# Patient Record
Sex: FEMALE | Race: WHITE | NOT HISPANIC OR LATINO
[De-identification: names, ages, dates, MRNs, and addresses within clinical notes are randomized per-mention and may not be internally consistent; named-entity substitution may affect disease eponyms.]

---

## 2017-02-06 ENCOUNTER — APPOINTMENT (OUTPATIENT)
Dept: MAMMOGRAPHY | Facility: IMAGING CENTER | Age: 47
End: 2017-02-06

## 2017-02-06 ENCOUNTER — APPOINTMENT (OUTPATIENT)
Dept: ULTRASOUND IMAGING | Facility: IMAGING CENTER | Age: 47
End: 2017-02-06

## 2017-02-06 ENCOUNTER — OUTPATIENT (OUTPATIENT)
Dept: OUTPATIENT SERVICES | Facility: HOSPITAL | Age: 47
LOS: 1 days | End: 2017-02-06
Payer: COMMERCIAL

## 2017-02-06 DIAGNOSIS — Z00.00 ENCOUNTER FOR GENERAL ADULT MEDICAL EXAMINATION WITHOUT ABNORMAL FINDINGS: ICD-10-CM

## 2017-02-06 PROCEDURE — G0279: CPT

## 2017-02-06 PROCEDURE — 77065 DX MAMMO INCL CAD UNI: CPT

## 2017-02-06 PROCEDURE — 76642 ULTRASOUND BREAST LIMITED: CPT

## 2017-02-19 ENCOUNTER — EMERGENCY (EMERGENCY)
Facility: HOSPITAL | Age: 47
LOS: 1 days | Discharge: HOME | End: 2017-02-19
Attending: EMERGENCY MEDICINE | Admitting: EMERGENCY MEDICINE
Payer: COMMERCIAL

## 2017-02-19 VITALS
DIASTOLIC BLOOD PRESSURE: 83 MMHG | HEART RATE: 81 BPM | TEMPERATURE: 98 F | OXYGEN SATURATION: 98 % | SYSTOLIC BLOOD PRESSURE: 132 MMHG | RESPIRATION RATE: 18 BRPM

## 2017-02-19 VITALS
RESPIRATION RATE: 20 BRPM | OXYGEN SATURATION: 97 % | SYSTOLIC BLOOD PRESSURE: 145 MMHG | HEART RATE: 80 BPM | DIASTOLIC BLOOD PRESSURE: 92 MMHG | HEIGHT: 69 IN | WEIGHT: 205.03 LBS | TEMPERATURE: 97 F

## 2017-02-19 DIAGNOSIS — Z88.1 ALLERGY STATUS TO OTHER ANTIBIOTIC AGENTS STATUS: ICD-10-CM

## 2017-02-19 DIAGNOSIS — M54.5 LOW BACK PAIN: ICD-10-CM
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
## 2017-02-19 DIAGNOSIS — K59.00 CONSTIPATION, UNSPECIFIED: ICD-10-CM
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
## 2017-02-19 LAB
ALBUMIN SERPL ELPH-MCNC: 4.7 G/DL — SIGNIFICANT CHANGE UP (ref 3.3–5)
ALP SERPL-CCNC: 68 U/L — SIGNIFICANT CHANGE UP (ref 40–120)
ALT FLD-CCNC: 26 U/L RC — SIGNIFICANT CHANGE UP (ref 10–45)
ANION GAP SERPL CALC-SCNC: 17 MMOL/L — SIGNIFICANT CHANGE UP (ref 5–17)
APPEARANCE UR: CLEAR — SIGNIFICANT CHANGE UP
AST SERPL-CCNC: 20 U/L — SIGNIFICANT CHANGE UP (ref 10–40)
BACTERIA # UR AUTO: ABNORMAL /HPF
BASOPHILS # BLD AUTO: 0 K/UL — SIGNIFICANT CHANGE UP (ref 0–0.2)
BASOPHILS NFR BLD AUTO: 0.3 % — SIGNIFICANT CHANGE UP (ref 0–2)
BILIRUB SERPL-MCNC: 0.3 MG/DL — SIGNIFICANT CHANGE UP (ref 0.2–1.2)
BILIRUB UR-MCNC: NEGATIVE — SIGNIFICANT CHANGE UP
BUN SERPL-MCNC: 11 MG/DL — SIGNIFICANT CHANGE UP (ref 7–23)
CALCIUM SERPL-MCNC: 9.2 MG/DL — SIGNIFICANT CHANGE UP (ref 8.4–10.5)
CHLORIDE SERPL-SCNC: 102 MMOL/L — SIGNIFICANT CHANGE UP (ref 96–108)
CO2 SERPL-SCNC: 20 MMOL/L — LOW (ref 22–31)
COLOR SPEC: COLORLESS — SIGNIFICANT CHANGE UP
CREAT SERPL-MCNC: 0.73 MG/DL — SIGNIFICANT CHANGE UP (ref 0.5–1.3)
DIFF PNL FLD: NEGATIVE — SIGNIFICANT CHANGE UP
EOSINOPHIL # BLD AUTO: 0 K/UL — SIGNIFICANT CHANGE UP (ref 0–0.5)
EOSINOPHIL NFR BLD AUTO: 0.1 % — SIGNIFICANT CHANGE UP (ref 0–6)
EPI CELLS # UR: SIGNIFICANT CHANGE UP /HPF
ERYTHROCYTE [SEDIMENTATION RATE] IN BLOOD: 5 MM/HR — SIGNIFICANT CHANGE UP (ref 0–15)
GLUCOSE SERPL-MCNC: 131 MG/DL — HIGH (ref 70–99)
GLUCOSE UR QL: NEGATIVE — SIGNIFICANT CHANGE UP
HCG UR QL: NEGATIVE — SIGNIFICANT CHANGE UP
HCT VFR BLD CALC: 42 % — SIGNIFICANT CHANGE UP (ref 34.5–45)
HGB BLD-MCNC: 14.1 G/DL — SIGNIFICANT CHANGE UP (ref 11.5–15.5)
KETONES UR-MCNC: NEGATIVE — SIGNIFICANT CHANGE UP
LEUKOCYTE ESTERASE UR-ACNC: NEGATIVE — SIGNIFICANT CHANGE UP
LYMPHOCYTES # BLD AUTO: 1 K/UL — SIGNIFICANT CHANGE UP (ref 1–3.3)
LYMPHOCYTES # BLD AUTO: 8.9 % — LOW (ref 13–44)
MCHC RBC-ENTMCNC: 32.4 PG — SIGNIFICANT CHANGE UP (ref 27–34)
MCHC RBC-ENTMCNC: 33.5 GM/DL — SIGNIFICANT CHANGE UP (ref 32–36)
MCV RBC AUTO: 96.6 FL — SIGNIFICANT CHANGE UP (ref 80–100)
MONOCYTES # BLD AUTO: 0.1 K/UL — SIGNIFICANT CHANGE UP (ref 0–0.9)
MONOCYTES NFR BLD AUTO: 1.1 % — LOW (ref 2–14)
NEUTROPHILS # BLD AUTO: 9.9 K/UL — HIGH (ref 1.8–7.4)
NEUTROPHILS NFR BLD AUTO: 89.7 % — HIGH (ref 43–77)
NITRITE UR-MCNC: NEGATIVE — SIGNIFICANT CHANGE UP
PH UR: 6.5 — SIGNIFICANT CHANGE UP (ref 4.8–8)
PLATELET # BLD AUTO: 375 K/UL — SIGNIFICANT CHANGE UP (ref 150–400)
POTASSIUM SERPL-MCNC: 4.3 MMOL/L — SIGNIFICANT CHANGE UP (ref 3.5–5.3)
POTASSIUM SERPL-SCNC: 4.3 MMOL/L — SIGNIFICANT CHANGE UP (ref 3.5–5.3)
PROT SERPL-MCNC: 7.5 G/DL — SIGNIFICANT CHANGE UP (ref 6–8.3)
PROT UR-MCNC: NEGATIVE — SIGNIFICANT CHANGE UP
RBC # BLD: 4.36 M/UL — SIGNIFICANT CHANGE UP (ref 3.8–5.2)
RBC # FLD: 11.9 % — SIGNIFICANT CHANGE UP (ref 10.3–14.5)
RBC CASTS # UR COMP ASSIST: SIGNIFICANT CHANGE UP /HPF (ref 0–2)
SODIUM SERPL-SCNC: 139 MMOL/L — SIGNIFICANT CHANGE UP (ref 135–145)
SP GR SPEC: 1 — LOW (ref 1.01–1.02)
UROBILINOGEN FLD QL: NEGATIVE — SIGNIFICANT CHANGE UP
WBC # BLD: 11.1 K/UL — HIGH (ref 3.8–10.5)
WBC # FLD AUTO: 11.1 K/UL — HIGH (ref 3.8–10.5)
WBC UR QL: SIGNIFICANT CHANGE UP /HPF (ref 0–5)

## 2017-02-19 PROCEDURE — 80053 COMPREHEN METABOLIC PANEL: CPT

## 2017-02-19 PROCEDURE — 72131 CT LUMBAR SPINE W/O DYE: CPT | Mod: 26

## 2017-02-19 PROCEDURE — 96374 THER/PROPH/DIAG INJ IV PUSH: CPT

## 2017-02-19 PROCEDURE — 72131 CT LUMBAR SPINE W/O DYE: CPT

## 2017-02-19 PROCEDURE — 81001 URINALYSIS AUTO W/SCOPE: CPT

## 2017-02-19 PROCEDURE — 99284 EMERGENCY DEPT VISIT MOD MDM: CPT | Mod: 25

## 2017-02-19 PROCEDURE — 99285 EMERGENCY DEPT VISIT HI MDM: CPT | Mod: 25

## 2017-02-19 PROCEDURE — 96375 TX/PRO/DX INJ NEW DRUG ADDON: CPT

## 2017-02-19 PROCEDURE — 85027 COMPLETE CBC AUTOMATED: CPT

## 2017-02-19 PROCEDURE — 81025 URINE PREGNANCY TEST: CPT

## 2017-02-19 PROCEDURE — 85652 RBC SED RATE AUTOMATED: CPT

## 2017-02-19 RX ORDER — ACETAMINOPHEN 500 MG
1000 TABLET ORAL ONCE
Qty: 0 | Refills: 0 | Status: COMPLETED | OUTPATIENT
Start: 2017-02-19 | End: 2017-02-19

## 2017-02-19 RX ORDER — DIAZEPAM 5 MG
5 TABLET ORAL ONCE
Qty: 0 | Refills: 0 | Status: DISCONTINUED | OUTPATIENT
Start: 2017-02-19 | End: 2017-02-19

## 2017-02-19 RX ORDER — KETOROLAC TROMETHAMINE 30 MG/ML
15 SYRINGE (ML) INJECTION ONCE
Qty: 0 | Refills: 0 | Status: DISCONTINUED | OUTPATIENT
Start: 2017-02-19 | End: 2017-02-19

## 2017-02-19 RX ORDER — MORPHINE SULFATE 50 MG/1
4 CAPSULE, EXTENDED RELEASE ORAL ONCE
Qty: 0 | Refills: 0 | Status: DISCONTINUED | OUTPATIENT
Start: 2017-02-19 | End: 2017-02-19

## 2017-02-19 RX ORDER — DEXAMETHASONE 0.5 MG/5ML
10 ELIXIR ORAL ONCE
Qty: 0 | Refills: 0 | Status: COMPLETED | OUTPATIENT
Start: 2017-02-19 | End: 2017-02-19

## 2017-02-19 RX ORDER — MORPHINE SULFATE 50 MG/1
1 CAPSULE, EXTENDED RELEASE ORAL
Qty: 4 | Refills: 0 | OUTPATIENT
Start: 2017-02-19 | End: 2017-02-21

## 2017-02-19 RX ORDER — DIAZEPAM 5 MG
1 TABLET ORAL
Qty: 12 | Refills: 0 | OUTPATIENT
Start: 2017-02-19 | End: 2017-02-22

## 2017-02-19 RX ORDER — HYDROMORPHONE HYDROCHLORIDE 2 MG/ML
1 INJECTION INTRAMUSCULAR; INTRAVENOUS; SUBCUTANEOUS
Qty: 8 | Refills: 0 | OUTPATIENT
Start: 2017-02-19 | End: 2017-02-21

## 2017-02-19 RX ORDER — ONDANSETRON 8 MG/1
4 TABLET, FILM COATED ORAL ONCE
Qty: 0 | Refills: 0 | Status: COMPLETED | OUTPATIENT
Start: 2017-02-19 | End: 2017-02-19

## 2017-02-19 RX ORDER — SODIUM CHLORIDE 9 MG/ML
1000 INJECTION INTRAMUSCULAR; INTRAVENOUS; SUBCUTANEOUS ONCE
Qty: 0 | Refills: 0 | Status: COMPLETED | OUTPATIENT
Start: 2017-02-19 | End: 2017-02-19

## 2017-02-19 RX ADMIN — Medication 15 MILLIGRAM(S): at 07:12

## 2017-02-19 RX ADMIN — ONDANSETRON 4 MILLIGRAM(S): 8 TABLET, FILM COATED ORAL at 10:15

## 2017-02-19 RX ADMIN — Medication 5 MILLIGRAM(S): at 08:48

## 2017-02-19 RX ADMIN — Medication 5 MILLIGRAM(S): at 09:21

## 2017-02-19 RX ADMIN — Medication 102 MILLIGRAM(S): at 07:13

## 2017-02-19 RX ADMIN — SODIUM CHLORIDE 1000 MILLILITER(S): 9 INJECTION INTRAMUSCULAR; INTRAVENOUS; SUBCUTANEOUS at 10:15

## 2017-02-19 RX ADMIN — Medication 15 MILLIGRAM(S): at 08:00

## 2017-02-19 RX ADMIN — MORPHINE SULFATE 4 MILLIGRAM(S): 50 CAPSULE, EXTENDED RELEASE ORAL at 10:15

## 2017-02-19 RX ADMIN — Medication 400 MILLIGRAM(S): at 07:35

## 2017-02-19 RX ADMIN — Medication 1000 MILLIGRAM(S): at 08:05
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
## 2017-02-19 NOTE — ED PROVIDER NOTE - MEDICAL DECISION MAKING DETAILS
Nichole Chna M.D: 47yoF, healthy with low back pain, acute onset after straining. no red flag symptoms (weakness, numbness/tingling, midline tenderness, incontinence); pain likely MSK in nature. will give toradol, tylenol, steroids, reassess. cannot give any relaxants as pt drove self.

## 2017-02-19 NOTE — ED PROVIDER NOTE - ATTENDING CONTRIBUTION TO CARE
Patient presenting c/o severe lower back pain began after position change two days ago.  Trying percocet/robaxin at home reporting no improvement.  No loss of B/B continence, no N/T/W.  Pain improves with movement/positioning.    On exam patient well appearing, VS WNL, RRR, CTABL, no midline TTP, neurologically intact.    Non traumatic back pain with no evidence of cauda equina/acute spinal cord emergency, will treat symptomatically with toradol/ofirmev/decadron and re-evaluate.

## 2017-02-19 NOTE — ED ADULT NURSE REASSESSMENT NOTE - NS ED NURSE REASSESS COMMENT FT1
Pt received from MATTHEW GONZALEZ in [place], alert and oriented times three, breathing spontaneous, unlabored without distress on room air, NAD, report mid thoracic back pain, will medicated with pain meds as ordered

## 2017-02-19 NOTE — ED PROVIDER NOTE - PHYSICAL EXAMINATION
Nichole Chan M.D.: patient awake alert NAD . LUNGS CTAB no wheeze no crackle. CARD RRR no m/r/g.  Abdomen soft NT ND no rebound no guarding no CVA tenderness. EXT WWP no edema no calf tenderness CV 2+DP/PT bilaterally. neuro A&Ox3 gait normal.  skin warm and dry no rash. no midline spine tenderness. negative straight leg b/l. paraspinal muscles tense and spasmodic in b/l lumbar area, but not ttp.

## 2017-02-19 NOTE — ED ADULT NURSE NOTE - OBJECTIVE STATEMENT
48 y/o female arrived to ED c.o mid back pain starting friday night. pt states that she lifted heavy bags out of the car and coughed at the same time pulling a muscle. Pt has taken old robacix, scilexa, aleve and ibuprophen. Lastly took a 5/325 mg percocet at 0430 with no relief. Pain is relieved when standing, worse with movement and deep breaths. Pt a&ox3, neg sob, neg chest pain, abd soft nontender, pulse motor sensoryx4, skin warm dry intact.

## 2017-02-19 NOTE — ED PROVIDER NOTE - PROGRESS NOTE DETAILS
pain worsening. after discussion with patient decision made to give valium. pt understands that she will not be able to drive home for a few hours. pt okay with this, states she could not go home with the pain as it is. 48yo Neurosurgery  NP, hx of low back pain prior which is usually relieved with NSAIDs, on friday bent over to  groceries and since then have significant back pain. took regular motrin robaxin but still complaining of pain. no fevers/chills. nonfocal neuro exam. extremely anxious and requesting imaging studies. will obtain UA, CT L-spine, and reevaluate. -ZR labs wnl. ct negative. pt feels reassured that nothing serious ongoing. okay to go home. pharmacy called regarding prescription. changed from morphine ER to dilaudid IR.

## 2017-02-20 NOTE — ED POST DISCHARGE NOTE - ADDITIONAL DOCUMENTATION
2,/20  4 pm  pt called [phone] to find out how is she doing  given  Dilaudid for pain yesterday ,unable to leave a massage  on phone  because   it is full

## 2017-03-13 ENCOUNTER — APPOINTMENT (OUTPATIENT)
Dept: PSYCHIATRY | Facility: CLINIC | Age: 47
End: 2017-03-13

## 2017-03-13 DIAGNOSIS — Z78.9 OTHER SPECIFIED HEALTH STATUS: ICD-10-CM

## 2017-06-22 ENCOUNTER — APPOINTMENT (OUTPATIENT)
Dept: OPHTHALMOLOGY | Facility: CLINIC | Age: 47
End: 2017-06-22

## 2017-06-23 ENCOUNTER — APPOINTMENT (OUTPATIENT)
Dept: OPHTHALMOLOGY | Facility: CLINIC | Age: 47
End: 2017-06-23

## 2017-06-23 ENCOUNTER — APPOINTMENT (OUTPATIENT)
Dept: DERMATOLOGY | Facility: CLINIC | Age: 47
End: 2017-06-23

## 2017-06-23 VITALS
BODY MASS INDEX: 29.33 KG/M2 | SYSTOLIC BLOOD PRESSURE: 112 MMHG | DIASTOLIC BLOOD PRESSURE: 78 MMHG | WEIGHT: 198 LBS | HEIGHT: 69 IN

## 2017-06-23 DIAGNOSIS — D22.9 MELANOCYTIC NEVI, UNSPECIFIED: ICD-10-CM

## 2017-06-23 DIAGNOSIS — Z97.3 PRESENCE OF SPECTACLES AND CONTACT LENSES: ICD-10-CM

## 2017-06-23 DIAGNOSIS — Z87.2 PERSONAL HISTORY OF DISEASES OF THE SKIN AND SUBCUTANEOUS TISSUE: ICD-10-CM

## 2017-06-23 RX ORDER — CETIRIZINE HCL 10 MG
TABLET ORAL
Refills: 0 | Status: ACTIVE | COMMUNITY

## 2017-06-23 RX ORDER — MULTIVITAMIN
TABLET ORAL
Refills: 0 | Status: ACTIVE | COMMUNITY

## 2017-06-23 RX ORDER — UBIDECARENONE/VIT E ACET 100MG-5
CAPSULE ORAL
Refills: 0 | Status: ACTIVE | COMMUNITY

## 2017-06-27 ENCOUNTER — APPOINTMENT (OUTPATIENT)
Dept: OPHTHALMOLOGY | Facility: CLINIC | Age: 47
End: 2017-06-27

## 2017-07-07 ENCOUNTER — APPOINTMENT (OUTPATIENT)
Dept: OPHTHALMOLOGY | Facility: CLINIC | Age: 47
End: 2017-07-07

## 2017-07-10 LAB — BACTERIA EYE AEROBE CULT: ABNORMAL

## 2017-07-14 ENCOUNTER — APPOINTMENT (OUTPATIENT)
Dept: OPHTHALMOLOGY | Facility: CLINIC | Age: 47
End: 2017-07-14

## 2017-07-24 ENCOUNTER — RX RENEWAL (OUTPATIENT)
Age: 47
End: 2017-07-24

## 2017-07-24 LAB — FUNGUS SPEC CULT ORG #8: NORMAL

## 2017-07-26 ENCOUNTER — APPOINTMENT (OUTPATIENT)
Dept: OPHTHALMOLOGY | Facility: CLINIC | Age: 47
End: 2017-07-26

## 2017-07-26 RX ORDER — MOXIFLOXACIN HYDROCHLORIDE 5 MG/ML
SOLUTION/ DROPS OPHTHALMIC
Refills: 0 | Status: DISCONTINUED | COMMUNITY
End: 2017-07-26

## 2017-07-26 RX ORDER — DEXTROAMPHETAMINE SACCHARATE, AMPHETAMINE ASPARTATE MONOHYDRATE, DEXTROAMPHETAMINE SULFATE AND AMPHETAMINE SULFATE 2.5; 2.5; 2.5; 2.5 MG/1; MG/1; MG/1; MG/1
10 CAPSULE, EXTENDED RELEASE ORAL TWICE DAILY
Qty: 60 | Refills: 0 | Status: DISCONTINUED | COMMUNITY
Start: 2017-03-13 | End: 2017-07-26

## 2017-07-26 RX ORDER — HYDROMORPHONE HYDROCHLORIDE 2 MG/1
2 TABLET ORAL
Qty: 8 | Refills: 0 | Status: DISCONTINUED | COMMUNITY
Start: 2017-02-19 | End: 2017-07-26

## 2017-07-26 RX ORDER — CLARITHROMYCIN 500 MG/1
500 TABLET, FILM COATED ORAL
Qty: 20 | Refills: 0 | Status: DISCONTINUED | COMMUNITY
Start: 2017-01-06 | End: 2017-07-26

## 2017-07-26 RX ORDER — PREDNISONE 10 MG/1
10 TABLET ORAL
Qty: 40 | Refills: 0 | Status: DISCONTINUED | COMMUNITY
Start: 2017-01-06 | End: 2017-07-26

## 2017-07-26 RX ORDER — MOXIFLOXACIN HYDROCHLORIDE 5 MG/ML
0.5 SOLUTION/ DROPS OPHTHALMIC
Qty: 3 | Refills: 3 | Status: DISCONTINUED | COMMUNITY
Start: 2017-06-22 | End: 2017-07-26

## 2017-07-26 RX ORDER — HYDROCODONE BITARTRATE AND HOMATROPINE METHYLBROMIDE 5; 1.5 MG/5ML; MG/5ML
5-1.5 SYRUP ORAL
Qty: 250 | Refills: 0 | Status: DISCONTINUED | COMMUNITY
Start: 2017-01-06 | End: 2017-07-26

## 2017-07-26 RX ORDER — METAXALONE 800 MG/1
800 TABLET ORAL
Qty: 90 | Refills: 0 | Status: DISCONTINUED | COMMUNITY
Start: 2017-03-07 | End: 2017-07-26

## 2017-07-26 RX ORDER — BENZONATATE 200 MG/1
200 CAPSULE ORAL
Qty: 90 | Refills: 0 | Status: DISCONTINUED | COMMUNITY
Start: 2017-01-06 | End: 2017-07-26

## 2017-07-26 RX ORDER — AMOXICILLIN AND CLAVULANATE POTASSIUM 875; 125 MG/1; MG/1
875-125 TABLET, COATED ORAL
Qty: 20 | Refills: 0 | Status: DISCONTINUED | COMMUNITY
Start: 2017-06-22 | End: 2017-07-26

## 2017-08-23 ENCOUNTER — EMERGENCY (EMERGENCY)
Facility: HOSPITAL | Age: 47
LOS: 1 days | End: 2017-08-23
Attending: EMERGENCY MEDICINE | Admitting: EMERGENCY MEDICINE
Payer: COMMERCIAL

## 2017-08-23 VITALS
DIASTOLIC BLOOD PRESSURE: 87 MMHG | TEMPERATURE: 97 F | RESPIRATION RATE: 18 BRPM | HEART RATE: 81 BPM | SYSTOLIC BLOOD PRESSURE: 152 MMHG | OXYGEN SATURATION: 96 %

## 2017-08-23 PROCEDURE — 93010 ELECTROCARDIOGRAM REPORT: CPT

## 2017-08-23 PROCEDURE — 99285 EMERGENCY DEPT VISIT HI MDM: CPT | Mod: 25

## 2017-08-23 NOTE — ED ADULT NURSE NOTE - CHPI ED SYMPTOMS NEG
no chills/no fever/no blood in stool/no hematuria/no vomiting/no diarrhea/no burning urination/no dysuria

## 2017-08-23 NOTE — ED ADULT NURSE NOTE - OBJECTIVE STATEMENT
46 y/o female presents to ED c/o abdominal pain x days. 48 y/o female presents to ED c/o epigastric abdominal and back pain, nausea that started at 2PM today. Pt states she was recently in [place] hiking and felt like she's been dehydrated. Pt states pain is epigastric and also between shoulders. States pain is sharp and intermittent. Pt took meloxicam and valium for pain with no relief. Pt states 46 y/o female presents to ED c/o epigastric abdominal and back pain, nausea that started at 2PM today. Pt states she was recently in [place] hiking and felt like she's been dehydrated. Pt states pain is epigastric and also between shoulders. States pain is sharp and intermittent. Pt took meloxicam and valium for pain with no relief. Denies urinary symptoms, chest pain, SOB. Pt lungs clear b/l. Skin warm, dry, intact. Tenderness upon RUQ  and back palpation. Gross motor and neuro intact.Pt safety and comfort provided. Family at bedside.

## 2017-08-24 ENCOUNTER — APPOINTMENT (OUTPATIENT)
Dept: MRI IMAGING | Facility: CLINIC | Age: 47
End: 2017-08-24

## 2017-08-24 ENCOUNTER — OUTPATIENT (OUTPATIENT)
Dept: OUTPATIENT SERVICES | Facility: HOSPITAL | Age: 47
LOS: 1 days | End: 2017-08-24
Payer: COMMERCIAL

## 2017-08-24 DIAGNOSIS — Z00.8 ENCOUNTER FOR OTHER GENERAL EXAMINATION: ICD-10-CM

## 2017-08-24 LAB
ALBUMIN SERPL ELPH-MCNC: 5.1 G/DL — HIGH (ref 3.3–5)
ALP SERPL-CCNC: 73 U/L — SIGNIFICANT CHANGE UP (ref 40–120)
ALT FLD-CCNC: 19 U/L RC — SIGNIFICANT CHANGE UP (ref 10–45)
ANION GAP SERPL CALC-SCNC: 13 MMOL/L — SIGNIFICANT CHANGE UP (ref 5–17)
APPEARANCE UR: CLEAR — SIGNIFICANT CHANGE UP
AST SERPL-CCNC: 22 U/L — SIGNIFICANT CHANGE UP (ref 10–40)
BASOPHILS # BLD AUTO: 0.1 K/UL — SIGNIFICANT CHANGE UP (ref 0–0.2)
BASOPHILS NFR BLD AUTO: 0.8 % — SIGNIFICANT CHANGE UP (ref 0–2)
BILIRUB SERPL-MCNC: 0.3 MG/DL — SIGNIFICANT CHANGE UP (ref 0.2–1.2)
BILIRUB UR-MCNC: NEGATIVE — SIGNIFICANT CHANGE UP
BUN SERPL-MCNC: 10 MG/DL — SIGNIFICANT CHANGE UP (ref 7–23)
CALCIUM SERPL-MCNC: 9.8 MG/DL — SIGNIFICANT CHANGE UP (ref 8.4–10.5)
CHLORIDE SERPL-SCNC: 100 MMOL/L — SIGNIFICANT CHANGE UP (ref 96–108)
CO2 SERPL-SCNC: 28 MMOL/L — SIGNIFICANT CHANGE UP (ref 22–31)
COLOR SPEC: SIGNIFICANT CHANGE UP
CREAT SERPL-MCNC: 0.82 MG/DL — SIGNIFICANT CHANGE UP (ref 0.5–1.3)
DIFF PNL FLD: NEGATIVE — SIGNIFICANT CHANGE UP
EOSINOPHIL # BLD AUTO: 0.1 K/UL — SIGNIFICANT CHANGE UP (ref 0–0.5)
EOSINOPHIL NFR BLD AUTO: 1.8 % — SIGNIFICANT CHANGE UP (ref 0–6)
EPI CELLS # UR: SIGNIFICANT CHANGE UP /HPF
GLUCOSE SERPL-MCNC: 109 MG/DL — HIGH (ref 70–99)
GLUCOSE UR QL: NEGATIVE — SIGNIFICANT CHANGE UP
HCG UR QL: NEGATIVE — SIGNIFICANT CHANGE UP
HCT VFR BLD CALC: 38.3 % — SIGNIFICANT CHANGE UP (ref 34.5–45)
HGB BLD-MCNC: 12.9 G/DL — SIGNIFICANT CHANGE UP (ref 11.5–15.5)
KETONES UR-MCNC: NEGATIVE — SIGNIFICANT CHANGE UP
LEUKOCYTE ESTERASE UR-ACNC: NEGATIVE — SIGNIFICANT CHANGE UP
LYMPHOCYTES # BLD AUTO: 2.2 K/UL — SIGNIFICANT CHANGE UP (ref 1–3.3)
LYMPHOCYTES # BLD AUTO: 30.2 % — SIGNIFICANT CHANGE UP (ref 13–44)
MCHC RBC-ENTMCNC: 32.9 PG — SIGNIFICANT CHANGE UP (ref 27–34)
MCHC RBC-ENTMCNC: 33.8 GM/DL — SIGNIFICANT CHANGE UP (ref 32–36)
MCV RBC AUTO: 97.3 FL — SIGNIFICANT CHANGE UP (ref 80–100)
MONOCYTES # BLD AUTO: 0.6 K/UL — SIGNIFICANT CHANGE UP (ref 0–0.9)
MONOCYTES NFR BLD AUTO: 7.6 % — SIGNIFICANT CHANGE UP (ref 2–14)
NEUTROPHILS # BLD AUTO: 4.3 K/UL — SIGNIFICANT CHANGE UP (ref 1.8–7.4)
NEUTROPHILS NFR BLD AUTO: 59.6 % — SIGNIFICANT CHANGE UP (ref 43–77)
NITRITE UR-MCNC: NEGATIVE — SIGNIFICANT CHANGE UP
PH UR: 6.5 — SIGNIFICANT CHANGE UP (ref 5–8)
PLATELET # BLD AUTO: 356 K/UL — SIGNIFICANT CHANGE UP (ref 150–400)
POTASSIUM SERPL-MCNC: 3.9 MMOL/L — SIGNIFICANT CHANGE UP (ref 3.5–5.3)
POTASSIUM SERPL-SCNC: 3.9 MMOL/L — SIGNIFICANT CHANGE UP (ref 3.5–5.3)
PROT SERPL-MCNC: 8.1 G/DL — SIGNIFICANT CHANGE UP (ref 6–8.3)
PROT UR-MCNC: NEGATIVE — SIGNIFICANT CHANGE UP
RBC # BLD: 3.94 M/UL — SIGNIFICANT CHANGE UP (ref 3.8–5.2)
RBC # FLD: 11.7 % — SIGNIFICANT CHANGE UP (ref 10.3–14.5)
RBC CASTS # UR COMP ASSIST: SIGNIFICANT CHANGE UP /HPF (ref 0–2)
SODIUM SERPL-SCNC: 141 MMOL/L — SIGNIFICANT CHANGE UP (ref 135–145)
SP GR SPEC: 1.01 — SIGNIFICANT CHANGE UP (ref 1.01–1.02)
UROBILINOGEN FLD QL: NEGATIVE — SIGNIFICANT CHANGE UP
WBC # BLD: 7.2 K/UL — SIGNIFICANT CHANGE UP (ref 3.8–10.5)
WBC # FLD AUTO: 7.2 K/UL — SIGNIFICANT CHANGE UP (ref 3.8–10.5)
WBC UR QL: SIGNIFICANT CHANGE UP /HPF (ref 0–5)

## 2017-08-24 PROCEDURE — 72146 MRI CHEST SPINE W/O DYE: CPT | Mod: 26

## 2017-08-24 PROCEDURE — 71020: CPT | Mod: 26

## 2017-08-24 PROCEDURE — 87086 URINE CULTURE/COLONY COUNT: CPT

## 2017-08-24 PROCEDURE — 93005 ELECTROCARDIOGRAM TRACING: CPT

## 2017-08-24 PROCEDURE — 99284 EMERGENCY DEPT VISIT MOD MDM: CPT | Mod: 25

## 2017-08-24 PROCEDURE — 72141 MRI NECK SPINE W/O DYE: CPT

## 2017-08-24 PROCEDURE — 85027 COMPLETE CBC AUTOMATED: CPT

## 2017-08-24 PROCEDURE — 96375 TX/PRO/DX INJ NEW DRUG ADDON: CPT

## 2017-08-24 PROCEDURE — 80053 COMPREHEN METABOLIC PANEL: CPT

## 2017-08-24 PROCEDURE — 96374 THER/PROPH/DIAG INJ IV PUSH: CPT

## 2017-08-24 PROCEDURE — 76700 US EXAM ABDOM COMPLETE: CPT | Mod: 26

## 2017-08-24 PROCEDURE — 72146 MRI CHEST SPINE W/O DYE: CPT

## 2017-08-24 PROCEDURE — 76700 US EXAM ABDOM COMPLETE: CPT

## 2017-08-24 PROCEDURE — 72141 MRI NECK SPINE W/O DYE: CPT | Mod: 26

## 2017-08-24 PROCEDURE — 81025 URINE PREGNANCY TEST: CPT

## 2017-08-24 PROCEDURE — 71046 X-RAY EXAM CHEST 2 VIEWS: CPT

## 2017-08-24 PROCEDURE — 83690 ASSAY OF LIPASE: CPT

## 2017-08-24 PROCEDURE — 81001 URINALYSIS AUTO W/SCOPE: CPT

## 2017-08-24 RX ORDER — OXYCODONE HYDROCHLORIDE 5 MG/1
5 TABLET ORAL ONCE
Qty: 0 | Refills: 0 | Status: DISCONTINUED | OUTPATIENT
Start: 2017-08-24 | End: 2017-08-24

## 2017-08-24 RX ORDER — KETOROLAC TROMETHAMINE 30 MG/ML
15 SYRINGE (ML) INJECTION ONCE
Qty: 0 | Refills: 0 | Status: DISCONTINUED | OUTPATIENT
Start: 2017-08-24 | End: 2017-08-24

## 2017-08-24 RX ORDER — ACETAMINOPHEN 500 MG
1000 TABLET ORAL ONCE
Qty: 0 | Refills: 0 | Status: COMPLETED | OUTPATIENT
Start: 2017-08-24 | End: 2017-08-24

## 2017-08-24 RX ADMIN — Medication 1000 MILLIGRAM(S): at 02:01

## 2017-08-24 RX ADMIN — Medication 400 MILLIGRAM(S): at 00:30

## 2017-08-24 RX ADMIN — OXYCODONE HYDROCHLORIDE 5 MILLIGRAM(S): 5 TABLET ORAL at 03:01

## 2017-08-24 RX ADMIN — OXYCODONE HYDROCHLORIDE 5 MILLIGRAM(S): 5 TABLET ORAL at 03:00

## 2017-08-24 RX ADMIN — Medication 15 MILLIGRAM(S): at 02:00

## 2017-08-24 RX ADMIN — Medication 15 MILLIGRAM(S): at 02:01

## 2017-08-24 NOTE — ED PROVIDER NOTE - PROGRESS NOTE DETAILS
pt became agitated and wanted to leave - pulled out iV and eloped. Jeff Lopez M.D., Attending Physician

## 2017-08-24 NOTE — ED PROVIDER NOTE - NS ED ROS FT
General: no fever, no chills  Ophthalmologic: no change in vision  ENMT: no sore throat  Respiratory and Thorax: no SOB, no cough  Cardiovascular: no CP  Gastrointestinal: +abdominal pain/heartburn  Genitourinary: no dysuria, no hematuria.   Musculoskeletal: +back pain  Neurological: no numbness, weakness, paresthesia, difficulty walking  Psychiatric: no anxiety/depression  Hematology/Lymphatics: no abnormal bleeding  Endocrine: no changes in weight

## 2017-08-24 NOTE — ED PROVIDER NOTE - MEDICAL DECISION MAKING DETAILS
47F presents to the ED with back pain. Pain started this afternoon around 2. has had back pain before so took valium maloxicam without much help. Pain persistent. also having some abdominal pain in epigastric region and ruq radiating to back. no nausea or vomiting. no fevers. no midline pain. no night sweats fevers weight loss. no saddle anesthesia. no cough or congestion. on exam pt with ttp in ruq. no cvat. concern that pain could be biliary vs pancreatic. will obtain labs xray ua lipase ruq us and reassess. Jeff Lopez M.D., Attending Physician

## 2017-08-24 NOTE — ED PROVIDER NOTE - OBJECTIVE STATEMENT
47/F with back spasm on flexeril, valium comes to ED complaining of back pain and abdominal pain a/w nausea. Back pain started 2 pm, is sharp, radiating to spot between her shoulders, unchanged by movement or valium. Pt was recently hiking (Mon/Tues) and feels dehydrated, endorses dark urine. No hx nephrolithaisis. Abdominal pain is less severe, epigastric, accompanied by heartburn. No trauma to back, no heavy lifting. Pt denies vomiting, diarrhea, dysuria, hematuria. Endorses constipation. Last BM today. Pt took meloxicam, flexeril, valium 10 x 2 with no relief. Other meds: vit D and Adderall.

## 2017-08-24 NOTE — ED ADULT NURSE REASSESSMENT NOTE - NS ED NURSE REASSESS COMMENT FT1
Pt eloped without dc instructions.  Refused to stay.  Pt pulled own IV out.  Left the unit aggravated, refusing to wait for test to come back.  MD. Lopez aware.

## 2017-08-24 NOTE — ED PROVIDER NOTE - CARE PLAN
Principal Discharge DX:	Back pain  Instructions for follow-up, activity and diet:	-check ua, ucx, cbc, cmp Principal Discharge DX:	Back pain  Instructions for follow-up, activity and diet:	-check ua, ucx, cbc, cmp, lipase, ruq us

## 2017-08-24 NOTE — ED PROVIDER NOTE - ATTENDING CONTRIBUTION TO CARE
47F presents to the ED with back pain. Pain started this afternoon around 2. has had back pain before so took valium maloxicam without much help. Pain persistent. also having some abdominal pain in epigastric region and ruq radiating to back. no nausea or vomiting. no fevers. no midline pain. no night sweats fevers weight loss. no saddle anesthesia. no cough or congestion. on exam pt with ttp in ruq. no cvat. concern that pain could be biliary vs pancreatic. will obtain labs xray ua lipase ruq us and reassess.     Constitutional: No fever or chills  Eyes: No visual changes  HEENT: No throat pain  CV: No chest pain  Resp: No SOB no cough  GI: + abd pain, nausea or vomiting  : No dysuria  MSK: + back pain  Skin: No rash  Neuro: No headache     Constitutional: mild distress AAOx3  Eyes: PERRLA EOMI  Head: Normocephalic atraumatic  Mouth: MMM  Cardiac: regular rate   Resp: Lungs CTAB  GI: Abd soft ttp in ruq no rebound or guarding no cvat negative mcburney.   Neuro: CN2-12 intact  Skin: No rashes   Jeff Lopez M.D., Attending Physician

## 2017-08-25 LAB
CULTURE RESULTS: NO GROWTH — SIGNIFICANT CHANGE UP
SPECIMEN SOURCE: SIGNIFICANT CHANGE UP

## 2017-09-08 ENCOUNTER — APPOINTMENT (OUTPATIENT)
Dept: OPHTHALMOLOGY | Facility: CLINIC | Age: 47
End: 2017-09-08
Payer: COMMERCIAL

## 2017-09-08 PROCEDURE — 92025 CPTRIZED CORNEAL TOPOGRAPHY: CPT

## 2017-09-08 PROCEDURE — 92012 INTRM OPH EXAM EST PATIENT: CPT

## 2017-09-11 ENCOUNTER — APPOINTMENT (OUTPATIENT)
Dept: OPHTHALMOLOGY | Facility: CLINIC | Age: 47
End: 2017-09-11
Payer: COMMERCIAL

## 2017-09-11 PROCEDURE — 92014 COMPRE OPH EXAM EST PT 1/>: CPT

## 2017-09-15 ENCOUNTER — APPOINTMENT (OUTPATIENT)
Dept: PSYCHIATRY | Facility: CLINIC | Age: 47
End: 2017-09-15
Payer: COMMERCIAL

## 2017-09-15 PROCEDURE — 99214 OFFICE O/P EST MOD 30 MIN: CPT

## 2017-09-15 RX ORDER — TIZANIDINE 4 MG/1
4 TABLET ORAL
Qty: 30 | Refills: 0 | Status: DISCONTINUED | COMMUNITY
Start: 2017-08-24

## 2017-09-19 ENCOUNTER — RX RENEWAL (OUTPATIENT)
Age: 47
End: 2017-09-19

## 2017-09-19 ENCOUNTER — APPOINTMENT (OUTPATIENT)
Dept: OPHTHALMOLOGY | Facility: CLINIC | Age: 47
End: 2017-09-19
Payer: COMMERCIAL

## 2017-09-19 PROCEDURE — 92310B: CUSTOM

## 2017-09-19 PROCEDURE — V2520A9: CUSTOM

## 2017-09-21 ENCOUNTER — OUTPATIENT (OUTPATIENT)
Dept: OUTPATIENT SERVICES | Facility: HOSPITAL | Age: 47
LOS: 1 days | End: 2017-09-21
Payer: COMMERCIAL

## 2017-09-21 ENCOUNTER — APPOINTMENT (OUTPATIENT)
Dept: MAMMOGRAPHY | Facility: CLINIC | Age: 47
End: 2017-09-21
Payer: COMMERCIAL

## 2017-09-21 ENCOUNTER — APPOINTMENT (OUTPATIENT)
Dept: ULTRASOUND IMAGING | Facility: CLINIC | Age: 47
End: 2017-09-21
Payer: COMMERCIAL

## 2017-09-21 DIAGNOSIS — Z00.8 ENCOUNTER FOR OTHER GENERAL EXAMINATION: ICD-10-CM

## 2017-09-21 DIAGNOSIS — R92.2 INCONCLUSIVE MAMMOGRAM: ICD-10-CM

## 2017-09-21 PROCEDURE — 76641 ULTRASOUND BREAST COMPLETE: CPT

## 2017-09-21 PROCEDURE — 76641 ULTRASOUND BREAST COMPLETE: CPT | Mod: 26,50

## 2017-09-21 PROCEDURE — G0279: CPT | Mod: 26

## 2017-09-21 PROCEDURE — G0279: CPT

## 2017-09-21 PROCEDURE — G0204: CPT | Mod: 26

## 2017-09-21 PROCEDURE — 77066 DX MAMMO INCL CAD BI: CPT

## 2017-09-28 ENCOUNTER — RX RENEWAL (OUTPATIENT)
Age: 47
End: 2017-09-28

## 2017-09-28 DIAGNOSIS — L56.4 POLYMORPHOUS LIGHT ERUPTION: ICD-10-CM

## 2017-10-06 ENCOUNTER — APPOINTMENT (OUTPATIENT)
Dept: PSYCHIATRY | Facility: CLINIC | Age: 47
End: 2017-10-06

## 2017-10-11 ENCOUNTER — OUTPATIENT (OUTPATIENT)
Dept: OUTPATIENT SERVICES | Facility: HOSPITAL | Age: 47
LOS: 1 days | End: 2017-10-11
Payer: COMMERCIAL

## 2017-10-11 VITALS
WEIGHT: 201.94 LBS | TEMPERATURE: 99 F | SYSTOLIC BLOOD PRESSURE: 109 MMHG | OXYGEN SATURATION: 98 % | HEIGHT: 69 IN | DIASTOLIC BLOOD PRESSURE: 79 MMHG | RESPIRATION RATE: 14 BRPM | HEART RATE: 87 BPM

## 2017-10-11 DIAGNOSIS — G57.62 LESION OF PLANTAR NERVE, LEFT LOWER LIMB: ICD-10-CM

## 2017-10-11 DIAGNOSIS — Z01.818 ENCOUNTER FOR OTHER PREPROCEDURAL EXAMINATION: ICD-10-CM

## 2017-10-11 PROCEDURE — 85027 COMPLETE CBC AUTOMATED: CPT

## 2017-10-11 PROCEDURE — G0463: CPT

## 2017-10-11 RX ORDER — LIDOCAINE HCL 20 MG/ML
0.2 VIAL (ML) INJECTION ONCE
Qty: 0 | Refills: 0 | Status: DISCONTINUED | OUTPATIENT
Start: 2017-10-13 | End: 2017-10-28

## 2017-10-11 RX ORDER — SODIUM CHLORIDE 9 MG/ML
3 INJECTION INTRAMUSCULAR; INTRAVENOUS; SUBCUTANEOUS EVERY 8 HOURS
Qty: 0 | Refills: 0 | Status: DISCONTINUED | OUTPATIENT
Start: 2017-10-13 | End: 2017-10-28

## 2017-10-11 NOTE — H&P PST ADULT - PSH
No significant past surgical history H/O sinus surgery  septoplasty/ turbinectomy 2008  [place] teeth extracted  1987

## 2017-10-11 NOTE — H&P PST ADULT - NSANTHOSAYNRD_GEN_A_CORE
No. ELIDA screening performed.  STOP BANG Legend: 0-2 = LOW Risk; 3-4 = INTERMEDIATE Risk; 5-8 = HIGH Risk

## 2017-10-11 NOTE — H&P PST ADULT - HISTORY OF PRESENT ILLNESS
47yr old female with lesion of plantar nerve coming in for  Left 3rd space IM ligament release for neuroma

## 2017-10-12 DIAGNOSIS — K08.499 PARTIAL LOSS OF TEETH DUE TO OTHER SPECIFIED CAUSE, UNSPECIFIED CLASS: Chronic | ICD-10-CM

## 2017-10-12 DIAGNOSIS — Z98.890 OTHER SPECIFIED POSTPROCEDURAL STATES: Chronic | ICD-10-CM

## 2017-10-13 ENCOUNTER — TRANSCRIPTION ENCOUNTER (OUTPATIENT)
Age: 47
End: 2017-10-13

## 2017-10-13 ENCOUNTER — OUTPATIENT (OUTPATIENT)
Dept: OUTPATIENT SERVICES | Facility: HOSPITAL | Age: 47
LOS: 1 days | End: 2017-10-13
Payer: COMMERCIAL

## 2017-10-13 VITALS
RESPIRATION RATE: 16 BRPM | WEIGHT: 201.94 LBS | TEMPERATURE: 97 F | SYSTOLIC BLOOD PRESSURE: 105 MMHG | DIASTOLIC BLOOD PRESSURE: 73 MMHG | HEIGHT: 69 IN | HEART RATE: 76 BPM | OXYGEN SATURATION: 99 %

## 2017-10-13 VITALS
RESPIRATION RATE: 18 BRPM | HEART RATE: 88 BPM | DIASTOLIC BLOOD PRESSURE: 79 MMHG | SYSTOLIC BLOOD PRESSURE: 112 MMHG | TEMPERATURE: 97 F | OXYGEN SATURATION: 99 %

## 2017-10-13 DIAGNOSIS — Z98.890 OTHER SPECIFIED POSTPROCEDURAL STATES: Chronic | ICD-10-CM

## 2017-10-13 DIAGNOSIS — K08.499 PARTIAL LOSS OF TEETH DUE TO OTHER SPECIFIED CAUSE, UNSPECIFIED CLASS: Chronic | ICD-10-CM

## 2017-10-13 DIAGNOSIS — G57.62 LESION OF PLANTAR NERVE, LEFT LOWER LIMB: ICD-10-CM

## 2017-10-13 PROCEDURE — 28080 REMOVAL OF FOOT LESION: CPT | Mod: LT

## 2017-10-13 RX ORDER — ONDANSETRON 8 MG/1
4 TABLET, FILM COATED ORAL ONCE
Qty: 0 | Refills: 0 | Status: DISCONTINUED | OUTPATIENT
Start: 2017-10-13 | End: 2017-10-28

## 2017-10-13 RX ORDER — SODIUM CHLORIDE 9 MG/ML
1000 INJECTION, SOLUTION INTRAVENOUS
Qty: 0 | Refills: 0 | Status: DISCONTINUED | OUTPATIENT
Start: 2017-10-13 | End: 2017-10-28

## 2017-10-13 RX ORDER — ACETAMINOPHEN 500 MG
1000 TABLET ORAL ONCE
Qty: 0 | Refills: 0 | Status: DISCONTINUED | OUTPATIENT
Start: 2017-10-13 | End: 2017-10-28

## 2017-10-13 NOTE — BRIEF OPERATIVE NOTE - PROCEDURE
<<-----Click on this checkbox to enter Procedure Neuroma surgery  10/13/2017  decompression left foot  Active  TBELNAP

## 2017-10-13 NOTE — ASU DISCHARGE PLAN (ADULT/PEDIATRIC). - NOTIFY
Persistent Nausea and Vomiting/Bleeding that does not stop/Numbness, color, or temperature change to extremity/Pain not relieved by Medications/Swelling that continues/Fever greater than 101

## 2017-10-20 ENCOUNTER — APPOINTMENT (OUTPATIENT)
Dept: PSYCHIATRY | Facility: CLINIC | Age: 47
End: 2017-10-20
Payer: COMMERCIAL

## 2017-10-20 VITALS — DIASTOLIC BLOOD PRESSURE: 92 MMHG | HEART RATE: 82 BPM | SYSTOLIC BLOOD PRESSURE: 143 MMHG

## 2017-10-20 PROCEDURE — 99214 OFFICE O/P EST MOD 30 MIN: CPT

## 2017-10-20 RX ORDER — ATORVASTATIN CALCIUM 10 MG/1
10 TABLET, FILM COATED ORAL
Qty: 30 | Refills: 0 | Status: DISCONTINUED | COMMUNITY
Start: 2017-09-15

## 2017-10-20 RX ORDER — CLINDAMYCIN HYDROCHLORIDE 300 MG/1
300 CAPSULE ORAL
Qty: 14 | Refills: 0 | Status: DISCONTINUED | COMMUNITY
Start: 2017-10-13

## 2017-11-20 ENCOUNTER — RX RENEWAL (OUTPATIENT)
Age: 47
End: 2017-11-20

## 2017-12-28 ENCOUNTER — MOBILE ON CALL (OUTPATIENT)
Age: 47
End: 2017-12-28

## 2018-01-25 ENCOUNTER — RESULT REVIEW (OUTPATIENT)
Age: 48
End: 2018-01-25

## 2018-03-07 ENCOUNTER — MOBILE ON CALL (OUTPATIENT)
Age: 48
End: 2018-03-07

## 2018-03-22 ENCOUNTER — OUTPATIENT (OUTPATIENT)
Dept: OUTPATIENT SERVICES | Facility: HOSPITAL | Age: 48
LOS: 1 days | End: 2018-03-22
Payer: COMMERCIAL

## 2018-03-22 ENCOUNTER — APPOINTMENT (OUTPATIENT)
Dept: MRI IMAGING | Facility: CLINIC | Age: 48
End: 2018-03-22
Payer: COMMERCIAL

## 2018-03-22 DIAGNOSIS — Z98.890 OTHER SPECIFIED POSTPROCEDURAL STATES: Chronic | ICD-10-CM

## 2018-03-22 DIAGNOSIS — Z00.8 ENCOUNTER FOR OTHER GENERAL EXAMINATION: ICD-10-CM

## 2018-03-22 DIAGNOSIS — K08.499 PARTIAL LOSS OF TEETH DUE TO OTHER SPECIFIED CAUSE, UNSPECIFIED CLASS: Chronic | ICD-10-CM

## 2018-03-22 PROCEDURE — 73718 MRI LOWER EXTREMITY W/O DYE: CPT | Mod: 26,LT

## 2018-03-22 PROCEDURE — 73718 MRI LOWER EXTREMITY W/O DYE: CPT

## 2018-04-03 ENCOUNTER — OUTPATIENT (OUTPATIENT)
Dept: OUTPATIENT SERVICES | Facility: HOSPITAL | Age: 48
LOS: 1 days | End: 2018-04-03
Payer: COMMERCIAL

## 2018-04-03 VITALS
WEIGHT: 205.91 LBS | SYSTOLIC BLOOD PRESSURE: 124 MMHG | RESPIRATION RATE: 16 BRPM | DIASTOLIC BLOOD PRESSURE: 75 MMHG | HEIGHT: 69.5 IN | HEART RATE: 110 BPM | OXYGEN SATURATION: 98 % | TEMPERATURE: 99 F

## 2018-04-03 DIAGNOSIS — G57.62 LESION OF PLANTAR NERVE, LEFT LOWER LIMB: ICD-10-CM

## 2018-04-03 DIAGNOSIS — Z98.890 OTHER SPECIFIED POSTPROCEDURAL STATES: Chronic | ICD-10-CM

## 2018-04-03 DIAGNOSIS — K08.499 PARTIAL LOSS OF TEETH DUE TO OTHER SPECIFIED CAUSE, UNSPECIFIED CLASS: Chronic | ICD-10-CM

## 2018-04-03 DIAGNOSIS — Z01.818 ENCOUNTER FOR OTHER PREPROCEDURAL EXAMINATION: ICD-10-CM

## 2018-04-03 LAB
ANION GAP SERPL CALC-SCNC: 13 MMOL/L — SIGNIFICANT CHANGE UP (ref 5–17)
BUN SERPL-MCNC: 11 MG/DL — SIGNIFICANT CHANGE UP (ref 7–23)
CALCIUM SERPL-MCNC: 9.6 MG/DL — SIGNIFICANT CHANGE UP (ref 8.4–10.5)
CHLORIDE SERPL-SCNC: 100 MMOL/L — SIGNIFICANT CHANGE UP (ref 96–108)
CO2 SERPL-SCNC: 25 MMOL/L — SIGNIFICANT CHANGE UP (ref 22–31)
CREAT SERPL-MCNC: 0.78 MG/DL — SIGNIFICANT CHANGE UP (ref 0.5–1.3)
GLUCOSE SERPL-MCNC: 94 MG/DL — SIGNIFICANT CHANGE UP (ref 70–99)
HBA1C BLD-MCNC: 5.6 % — SIGNIFICANT CHANGE UP (ref 4–5.6)
HCT VFR BLD CALC: 42.3 % — SIGNIFICANT CHANGE UP (ref 34.5–45)
HGB BLD-MCNC: 14.3 G/DL — SIGNIFICANT CHANGE UP (ref 11.5–15.5)
MCHC RBC-ENTMCNC: 31.6 PG — SIGNIFICANT CHANGE UP (ref 27–34)
MCHC RBC-ENTMCNC: 33.8 GM/DL — SIGNIFICANT CHANGE UP (ref 32–36)
MCV RBC AUTO: 93.6 FL — SIGNIFICANT CHANGE UP (ref 80–100)
NRBC # BLD: 0 /100 WBCS — SIGNIFICANT CHANGE UP (ref 0–0)
PLATELET # BLD AUTO: 446 K/UL — HIGH (ref 150–400)
POTASSIUM SERPL-MCNC: 4.1 MMOL/L — SIGNIFICANT CHANGE UP (ref 3.5–5.3)
POTASSIUM SERPL-SCNC: 4.1 MMOL/L — SIGNIFICANT CHANGE UP (ref 3.5–5.3)
RBC # BLD: 4.52 M/UL — SIGNIFICANT CHANGE UP (ref 3.8–5.2)
RBC # FLD: 14.3 % — SIGNIFICANT CHANGE UP (ref 10.3–14.5)
SODIUM SERPL-SCNC: 138 MMOL/L — SIGNIFICANT CHANGE UP (ref 135–145)
WBC # BLD: 7.05 K/UL — SIGNIFICANT CHANGE UP (ref 3.8–10.5)
WBC # FLD AUTO: 7.05 K/UL — SIGNIFICANT CHANGE UP (ref 3.8–10.5)

## 2018-04-03 PROCEDURE — G0463: CPT

## 2018-04-03 PROCEDURE — 85027 COMPLETE CBC AUTOMATED: CPT

## 2018-04-03 PROCEDURE — 83036 HEMOGLOBIN GLYCOSYLATED A1C: CPT

## 2018-04-03 PROCEDURE — 80048 BASIC METABOLIC PNL TOTAL CA: CPT

## 2018-04-03 RX ORDER — CHOLECALCIFEROL (VITAMIN D3) 125 MCG
1 CAPSULE ORAL
Qty: 0 | Refills: 0 | COMMUNITY

## 2018-04-03 RX ORDER — LIDOCAINE HCL 20 MG/ML
0.2 VIAL (ML) INJECTION ONCE
Qty: 0 | Refills: 0 | Status: DISCONTINUED | OUTPATIENT
Start: 2018-04-09 | End: 2018-04-24

## 2018-04-03 RX ORDER — DEXTROAMPHETAMINE SACCHARATE, AMPHETAMINE ASPARTATE, DEXTROAMPHETAMINE SULFATE AND AMPHETAMINE SULFATE 1.875; 1.875; 1.875; 1.875 MG/1; MG/1; MG/1; MG/1
0 TABLET ORAL
Qty: 0 | Refills: 0 | COMMUNITY

## 2018-04-03 RX ORDER — IBUPROFEN 200 MG
0 TABLET ORAL
Qty: 0 | Refills: 0 | COMMUNITY

## 2018-04-03 RX ORDER — SODIUM CHLORIDE 9 MG/ML
3 INJECTION INTRAMUSCULAR; INTRAVENOUS; SUBCUTANEOUS EVERY 8 HOURS
Qty: 0 | Refills: 0 | Status: DISCONTINUED | OUTPATIENT
Start: 2018-04-09 | End: 2018-04-24

## 2018-04-03 RX ORDER — MELOXICAM 15 MG/1
1 TABLET ORAL
Qty: 0 | Refills: 0 | COMMUNITY

## 2018-04-03 NOTE — H&P PST ADULT - PROBLEM SELECTOR PLAN 1
left mccall neuroma excision  PST instructions provided, surgical scrub given, patient verbalized understanding.   CBC, BMP, HgA1C collected and send.   UcG on admission left mccall neuroma excision  PST instructions provided, surgical scrub given, patient verbalized understanding.   CBC, BMP, HgA1C ( h/o elevated Hg A1C )  collected and send.   UcG on admission

## 2018-04-03 NOTE — H&P PST ADULT - PMH
ADD (attention deficit disorder)    Anxiety    Back injury  muscle spasm  Carrillo neuroma, left    Seasonal allergies ADD (attention deficit disorder)    Anxiety    Back injury  muscle spasm  Hyperlipidemia    Carrillo neuroma, left  foot pain  Seasonal allergies

## 2018-04-03 NOTE — H&P PST ADULT - HISTORY OF PRESENT ILLNESS
47yr old female with lesion of plantar nerve coming in for  Left 3rd space IM ligament release for neuroma 48 yr old female with left mccall neuroma, s/p Left 3rd space IM ligament release for neuroma 10/2017,  c/o left foot  pain,  presents to PST for scheduled excision on 4/9/18. Denies fever, chills, no acute complaints. 48 yr old female with left mccall neuroma, s/p Left 3rd space IM ligament release 10/2017,  c/o left foot  pain,  presents to PST for scheduled excision on 4/9/18. Denies fever, chills, no acute complaints.

## 2018-04-03 NOTE — H&P PST ADULT - NEGATIVE CARDIOVASCULAR SYMPTOMS
no claudication/no chest pain/no peripheral edema/no dyspnea on exertion/no orthopnea/no paroxysmal nocturnal dyspnea/no palpitations

## 2018-04-03 NOTE — H&P PST ADULT - PSH
H/O foot surgery  2017, Left 3rd space IM ligament release for neuroma  H/O sinus surgery  septoplasty/ turbinectomy 2008  [place] teeth extracted  1987

## 2018-04-03 NOTE — H&P PST ADULT - NSANTHOSAYNRD_GEN_A_CORE
No. ELIDA screening performed.  STOP BANG Legend: 0-2 = LOW Risk; 3-4 = INTERMEDIATE Risk; 5-8 = HIGH Risk No. ELIDA screening performed.  STOP BANG Legend: 0-2 = LOW Risk; 3-4 = INTERMEDIATE Risk; 5-8 = HIGH Risk/neck 14 inches

## 2018-04-03 NOTE — H&P PST ADULT - AS BP NONINV METHOD
electronic elevated HR due to anxiety ( as per Patient , chronic, no changes )/electronic elevated HR due to anxiety ( as per patient , chronic, no changes )/electronic

## 2018-04-08 ENCOUNTER — TRANSCRIPTION ENCOUNTER (OUTPATIENT)
Age: 48
End: 2018-04-08

## 2018-04-09 ENCOUNTER — OUTPATIENT (OUTPATIENT)
Dept: OUTPATIENT SERVICES | Facility: HOSPITAL | Age: 48
LOS: 1 days | End: 2018-04-09
Payer: COMMERCIAL

## 2018-04-09 ENCOUNTER — RESULT REVIEW (OUTPATIENT)
Age: 48
End: 2018-04-09

## 2018-04-09 VITALS
OXYGEN SATURATION: 100 % | DIASTOLIC BLOOD PRESSURE: 84 MMHG | SYSTOLIC BLOOD PRESSURE: 127 MMHG | RESPIRATION RATE: 18 BRPM | TEMPERATURE: 98 F | HEIGHT: 69.5 IN | WEIGHT: 205.91 LBS | HEART RATE: 78 BPM

## 2018-04-09 VITALS
TEMPERATURE: 98 F | OXYGEN SATURATION: 100 % | HEART RATE: 82 BPM | SYSTOLIC BLOOD PRESSURE: 112 MMHG | DIASTOLIC BLOOD PRESSURE: 75 MMHG | RESPIRATION RATE: 20 BRPM

## 2018-04-09 DIAGNOSIS — Z98.890 OTHER SPECIFIED POSTPROCEDURAL STATES: Chronic | ICD-10-CM

## 2018-04-09 DIAGNOSIS — G57.62 LESION OF PLANTAR NERVE, LEFT LOWER LIMB: ICD-10-CM

## 2018-04-09 DIAGNOSIS — Z01.818 ENCOUNTER FOR OTHER PREPROCEDURAL EXAMINATION: ICD-10-CM

## 2018-04-09 DIAGNOSIS — K08.499 PARTIAL LOSS OF TEETH DUE TO OTHER SPECIFIED CAUSE, UNSPECIFIED CLASS: Chronic | ICD-10-CM

## 2018-04-09 PROCEDURE — 88305 TISSUE EXAM BY PATHOLOGIST: CPT

## 2018-04-09 PROCEDURE — 28080 REMOVAL OF FOOT LESION: CPT | Mod: LT

## 2018-04-09 PROCEDURE — 88305 TISSUE EXAM BY PATHOLOGIST: CPT | Mod: 26

## 2018-04-09 RX ORDER — OXYCODONE HYDROCHLORIDE 5 MG/1
5 TABLET ORAL ONCE
Qty: 0 | Refills: 0 | Status: DISCONTINUED | OUTPATIENT
Start: 2018-04-09 | End: 2018-04-09

## 2018-04-09 RX ORDER — SODIUM CHLORIDE 9 MG/ML
1000 INJECTION, SOLUTION INTRAVENOUS
Qty: 0 | Refills: 0 | Status: DISCONTINUED | OUTPATIENT
Start: 2018-04-09 | End: 2018-04-24

## 2018-04-09 RX ORDER — ONDANSETRON 8 MG/1
4 TABLET, FILM COATED ORAL ONCE
Qty: 0 | Refills: 0 | Status: DISCONTINUED | OUTPATIENT
Start: 2018-04-09 | End: 2018-04-24

## 2018-04-09 NOTE — PRE-ANESTHESIA EVALUATION ADULT - NSANTHOSAYNRD_GEN_A_CORE
No. ELIDA screening performed.  STOP BANG Legend: 0-2 = LOW Risk; 3-4 = INTERMEDIATE Risk; 5-8 = HIGH Risk/neck 14 inches

## 2018-04-09 NOTE — ASU DISCHARGE PLAN (ADULT/PEDIATRIC). - MEDICATION SUMMARY - MEDICATIONS TO TAKE
I will START or STAY ON the medications listed below when I get home from the hospital:    gabapentin 100 mg oral capsule  -- 2 cap(s) by mouth once a day (at bedtime)  -- Indication: For As prescribed     Allegra 30 mg oral tablet  -- orally once a day  -- Indication: For As prescribed     Lipitor 10 mg oral tablet  -- 1 tab(s) by mouth once a day (at bedtime)  -- Indication: For As prescribed     famotidine 20 mg oral tablet  -- orally once a day pm and am of surgery  -- Indication: For As prescribed     Multi-Day Plus Minerals oral tablet  -- 1 tab(s) by mouth once a day  -- Indication: For As prescribed     Vitamin D3 5000 intl units oral tablet  -- 1 tab(s) by mouth 3 times a week  -- Indication: For As prescribed

## 2018-04-09 NOTE — ASU PATIENT PROFILE, ADULT - PMH
ADD (attention deficit disorder)    Anxiety    Back injury  muscle spasm  Hyperlipidemia    Carrillo neuroma, left  foot pain  Seasonal allergies

## 2018-04-09 NOTE — ASU DISCHARGE PLAN (ADULT/PEDIATRIC). - NOTIFY
Fever greater than 101/Swelling that continues/Numbness, tingling/Excessive Diarrhea/Bleeding that does not stop/Persistent Nausea and Vomiting/Numbness, color, or temperature change to extremity/Inability to Tolerate Liquids or Foods/Unable to Urinate/Increased Irritability or Sluggishness/Pain not relieved by Medications

## 2018-04-09 NOTE — PACU DISCHARGE NOTE - NSPTMEETSDISCHCRITERIADT_GEN_A_CORE
Advised patient of Dr. Ari Houser note. Patient verbalized understanding. Patient only wants to stay with [facility], does not want to go outside of the [facility] doctors. Referral generated.  Patient would like a call back from [facility] with status of refe 09-Apr-2018 09:35

## 2018-04-09 NOTE — ASU PATIENT PROFILE, ADULT - PSH
H/O foot surgery  2017, Left 3rd space IM ligament release for neuroma  H/O sinus surgery  septoplasty/ turbinectomy 2008  [place] teeth extracted  1987

## 2018-05-01 ENCOUNTER — APPOINTMENT (OUTPATIENT)
Dept: DERMATOLOGY | Facility: CLINIC | Age: 48
End: 2018-05-01
Payer: COMMERCIAL

## 2018-05-01 VITALS
BODY MASS INDEX: 30.51 KG/M2 | HEIGHT: 69 IN | HEART RATE: 91 BPM | SYSTOLIC BLOOD PRESSURE: 116 MMHG | WEIGHT: 206 LBS | TEMPERATURE: 98.5 F | DIASTOLIC BLOOD PRESSURE: 79 MMHG | OXYGEN SATURATION: 96 %

## 2018-05-01 DIAGNOSIS — L81.4 OTHER MELANIN HYPERPIGMENTATION: ICD-10-CM

## 2018-05-01 DIAGNOSIS — D22.9 MELANOCYTIC NEVI, UNSPECIFIED: ICD-10-CM

## 2018-05-01 DIAGNOSIS — Z12.83 ENCOUNTER FOR SCREENING FOR MALIGNANT NEOPLASM OF SKIN: ICD-10-CM

## 2018-05-01 DIAGNOSIS — L71.8 OTHER ROSACEA: ICD-10-CM

## 2018-05-01 PROCEDURE — 99213 OFFICE O/P EST LOW 20 MIN: CPT

## 2018-07-16 PROBLEM — S39.92XA UNSPECIFIED INJURY OF LOWER BACK, INITIAL ENCOUNTER: Chronic | Status: ACTIVE | Noted: 2017-02-19

## 2018-07-17 PROBLEM — G57.62 LESION OF PLANTAR NERVE, LEFT LOWER LIMB: Chronic | Status: ACTIVE | Noted: 2018-04-03

## 2018-08-08 PROBLEM — J30.2 OTHER SEASONAL ALLERGIC RHINITIS: Chronic | Status: ACTIVE | Noted: 2017-10-11

## 2018-08-08 PROBLEM — F98.8 OTHER SPECIFIED BEHAVIORAL AND EMOTIONAL DISORDERS WITH ONSET USUALLY OCCURRING IN CHILDHOOD AND ADOLESCENCE: Chronic | Status: ACTIVE | Noted: 2017-10-11

## 2018-09-28 ENCOUNTER — APPOINTMENT (OUTPATIENT)
Dept: ULTRASOUND IMAGING | Facility: CLINIC | Age: 48
End: 2018-09-28

## 2018-09-28 ENCOUNTER — APPOINTMENT (OUTPATIENT)
Dept: MAMMOGRAPHY | Facility: CLINIC | Age: 48
End: 2018-09-28

## 2018-09-28 ENCOUNTER — OUTPATIENT (OUTPATIENT)
Dept: OUTPATIENT SERVICES | Facility: HOSPITAL | Age: 48
LOS: 1 days | End: 2018-09-28
Payer: COMMERCIAL

## 2018-09-28 DIAGNOSIS — Z98.890 OTHER SPECIFIED POSTPROCEDURAL STATES: Chronic | ICD-10-CM

## 2018-09-28 DIAGNOSIS — K08.499 PARTIAL LOSS OF TEETH DUE TO OTHER SPECIFIED CAUSE, UNSPECIFIED CLASS: Chronic | ICD-10-CM

## 2018-09-28 DIAGNOSIS — Z00.8 ENCOUNTER FOR OTHER GENERAL EXAMINATION: ICD-10-CM

## 2018-09-28 DIAGNOSIS — Z12.31 ENCOUNTER FOR SCREENING MAMMOGRAM FOR MALIGNANT NEOPLASM OF BREAST: ICD-10-CM

## 2018-09-28 PROCEDURE — 77066 DX MAMMO INCL CAD BI: CPT

## 2018-09-28 PROCEDURE — G0279: CPT

## 2018-09-28 PROCEDURE — 76641 ULTRASOUND BREAST COMPLETE: CPT

## 2018-10-24 ENCOUNTER — APPOINTMENT (OUTPATIENT)
Dept: ORTHOPEDIC SURGERY | Facility: CLINIC | Age: 48
End: 2018-10-24
Payer: COMMERCIAL

## 2018-10-24 PROCEDURE — 99213 OFFICE O/P EST LOW 20 MIN: CPT

## 2018-10-24 PROCEDURE — 73030 X-RAY EXAM OF SHOULDER: CPT | Mod: RT

## 2018-11-20 ENCOUNTER — APPOINTMENT (OUTPATIENT)
Dept: ORTHOPEDIC SURGERY | Facility: CLINIC | Age: 48
End: 2018-11-20
Payer: COMMERCIAL

## 2018-11-20 VITALS — DIASTOLIC BLOOD PRESSURE: 74 MMHG | SYSTOLIC BLOOD PRESSURE: 115 MMHG | HEART RATE: 75 BPM

## 2018-11-20 PROCEDURE — 99213 OFFICE O/P EST LOW 20 MIN: CPT

## 2018-11-29 ENCOUNTER — OTHER (OUTPATIENT)
Age: 48
End: 2018-11-29

## 2018-11-30 ENCOUNTER — OUTPATIENT (OUTPATIENT)
Dept: OUTPATIENT SERVICES | Facility: HOSPITAL | Age: 48
LOS: 1 days | End: 2018-11-30
Payer: COMMERCIAL

## 2018-11-30 ENCOUNTER — APPOINTMENT (OUTPATIENT)
Dept: MRI IMAGING | Facility: CLINIC | Age: 48
End: 2018-11-30
Payer: COMMERCIAL

## 2018-11-30 DIAGNOSIS — Z98.890 OTHER SPECIFIED POSTPROCEDURAL STATES: Chronic | ICD-10-CM

## 2018-11-30 DIAGNOSIS — K08.499 PARTIAL LOSS OF TEETH DUE TO OTHER SPECIFIED CAUSE, UNSPECIFIED CLASS: Chronic | ICD-10-CM

## 2018-11-30 DIAGNOSIS — M75.31 CALCIFIC TENDINITIS OF RIGHT SHOULDER: ICD-10-CM

## 2018-11-30 PROCEDURE — 73221 MRI JOINT UPR EXTREM W/O DYE: CPT | Mod: 26,RT

## 2018-11-30 PROCEDURE — 73221 MRI JOINT UPR EXTREM W/O DYE: CPT

## 2018-12-03 ENCOUNTER — OTHER (OUTPATIENT)
Age: 48
End: 2018-12-03

## 2018-12-04 ENCOUNTER — MOBILE ON CALL (OUTPATIENT)
Age: 48
End: 2018-12-04

## 2018-12-12 ENCOUNTER — APPOINTMENT (OUTPATIENT)
Dept: ORTHOPEDIC SURGERY | Facility: CLINIC | Age: 48
End: 2018-12-12
Payer: COMMERCIAL

## 2018-12-12 PROCEDURE — 99214 OFFICE O/P EST MOD 30 MIN: CPT

## 2018-12-17 ENCOUNTER — OUTPATIENT (OUTPATIENT)
Dept: OUTPATIENT SERVICES | Facility: HOSPITAL | Age: 48
LOS: 1 days | End: 2018-12-17

## 2018-12-17 VITALS
WEIGHT: 203.93 LBS | TEMPERATURE: 98 F | DIASTOLIC BLOOD PRESSURE: 84 MMHG | HEIGHT: 68.5 IN | HEART RATE: 78 BPM | RESPIRATION RATE: 16 BRPM | SYSTOLIC BLOOD PRESSURE: 120 MMHG

## 2018-12-17 DIAGNOSIS — Z98.890 OTHER SPECIFIED POSTPROCEDURAL STATES: Chronic | ICD-10-CM

## 2018-12-17 DIAGNOSIS — K08.499 PARTIAL LOSS OF TEETH DUE TO OTHER SPECIFIED CAUSE, UNSPECIFIED CLASS: Chronic | ICD-10-CM

## 2018-12-17 DIAGNOSIS — M25.511 PAIN IN RIGHT SHOULDER: ICD-10-CM

## 2018-12-17 DIAGNOSIS — M75.31 CALCIFIC TENDINITIS OF RIGHT SHOULDER: ICD-10-CM

## 2018-12-17 LAB
HCG SERPL-ACNC: < 5 MIU/ML — SIGNIFICANT CHANGE UP
HCT VFR BLD CALC: 43.7 % — SIGNIFICANT CHANGE UP (ref 34.5–45)
HGB BLD-MCNC: 14.5 G/DL — SIGNIFICANT CHANGE UP (ref 11.5–15.5)
MCHC RBC-ENTMCNC: 32.4 PG — SIGNIFICANT CHANGE UP (ref 27–34)
MCHC RBC-ENTMCNC: 33.2 % — SIGNIFICANT CHANGE UP (ref 32–36)
MCV RBC AUTO: 97.8 FL — SIGNIFICANT CHANGE UP (ref 80–100)
NRBC # FLD: 0 — SIGNIFICANT CHANGE UP
PLATELET # BLD AUTO: 372 K/UL — SIGNIFICANT CHANGE UP (ref 150–400)
PMV BLD: 9.3 FL — SIGNIFICANT CHANGE UP (ref 7–13)
RBC # BLD: 4.47 M/UL — SIGNIFICANT CHANGE UP (ref 3.8–5.2)
RBC # FLD: 12 % — SIGNIFICANT CHANGE UP (ref 10.3–14.5)
WBC # BLD: 5.81 K/UL — SIGNIFICANT CHANGE UP (ref 3.8–10.5)
WBC # FLD AUTO: 5.81 K/UL — SIGNIFICANT CHANGE UP (ref 3.8–10.5)

## 2018-12-17 RX ORDER — FAMOTIDINE 10 MG/ML
0 INJECTION INTRAVENOUS
Qty: 0 | Refills: 0 | COMMUNITY

## 2018-12-17 RX ORDER — FEXOFENADINE HCL 30 MG
0 TABLET ORAL
Qty: 0 | Refills: 0 | COMMUNITY

## 2018-12-17 RX ORDER — GABAPENTIN 400 MG/1
2 CAPSULE ORAL
Qty: 0 | Refills: 0 | COMMUNITY

## 2018-12-17 NOTE — H&P PST ADULT - HISTORY OF PRESENT ILLNESS
49 y/o female with hx HLD presents to PST for scheduled right shoulder major debridement antroscopy rotator cuff repair on 12/19/18. Patient reports slip and fall after [place].  Seen by orthopedic and received steroid injection x1 followed by PT with no relief. 47 y/o female with hx HLD presents to PST for scheduled right shoulder major debridement arthroscopy rotator cuff repair on 12/19/18. Patient reports slip and fall after getting [place] on [place].  Seen by orthopedic and received steroid injection x1 followed by PT with no relief.

## 2018-12-17 NOTE — H&P PST ADULT - NEGATIVE ENMT SYMPTOMS
no ear pain/no dysphagia/no dry mouth/no sinus symptoms/no tinnitus/no hearing difficulty/no vertigo/no throat pain

## 2018-12-17 NOTE — H&P PST ADULT - PROBLEM SELECTOR PLAN 1
Patient scheduled right shoulder major debridement antroscopy rotator cuff repair on 12/19/18.  Pre op and Hibiclens instructions given and explained.  Avoid NSAIDs and OTC supplements.   Patient verbalized understanding  medical consult requested scheduled by surgeon Patient scheduled right shoulder major debridement arthroscopy rotator cuff repair on 12/19/18.  Pre op and Hibiclens instructions given and explained.  Avoid NSAIDs and OTC supplements.   Patient verbalized understanding  medical consult requested scheduled by surgeon

## 2018-12-17 NOTE — H&P PST ADULT - PMH
ADD (attention deficit disorder)    Anxiety    Back injury  muscle spasm  Hyperlipidemia    Carrillo neuroma, left  foot pain  Right shoulder pain    Seasonal allergies

## 2018-12-17 NOTE — H&P PST ADULT - NEGATIVE MUSCULOSKELETAL SYMPTOMS
no neck pain/no arm pain R/no leg pain R/no arthritis/no arm pain L/no back pain/no leg pain L/no stiffness

## 2018-12-17 NOTE — H&P PST ADULT - PSH
H/O foot surgery  2017, Left 3rd space IM ligament release for neuroma  H/O left breast biopsy    H/O sinus surgery  septoplasty/ turbinectomy 2008  [place] teeth extracted  1987

## 2018-12-18 ENCOUNTER — TRANSCRIPTION ENCOUNTER (OUTPATIENT)
Age: 48
End: 2018-12-18

## 2018-12-19 ENCOUNTER — APPOINTMENT (OUTPATIENT)
Dept: ORTHOPEDIC SURGERY | Facility: AMBULATORY SURGERY CENTER | Age: 48
End: 2018-12-19

## 2018-12-19 ENCOUNTER — OUTPATIENT (OUTPATIENT)
Dept: OUTPATIENT SERVICES | Facility: HOSPITAL | Age: 48
LOS: 1 days | Discharge: HOME | End: 2018-12-19
Payer: COMMERCIAL

## 2018-12-19 VITALS
DIASTOLIC BLOOD PRESSURE: 78 MMHG | HEART RATE: 99 BPM | OXYGEN SATURATION: 98 % | RESPIRATION RATE: 12 BRPM | SYSTOLIC BLOOD PRESSURE: 134 MMHG

## 2018-12-19 VITALS
RESPIRATION RATE: 16 BRPM | OXYGEN SATURATION: 100 % | DIASTOLIC BLOOD PRESSURE: 80 MMHG | HEART RATE: 78 BPM | WEIGHT: 203.93 LBS | SYSTOLIC BLOOD PRESSURE: 123 MMHG | HEIGHT: 68.5 IN | TEMPERATURE: 98 F

## 2018-12-19 DIAGNOSIS — K08.499 PARTIAL LOSS OF TEETH DUE TO OTHER SPECIFIED CAUSE, UNSPECIFIED CLASS: Chronic | ICD-10-CM

## 2018-12-19 DIAGNOSIS — M75.31 CALCIFIC TENDINITIS OF RIGHT SHOULDER: ICD-10-CM

## 2018-12-19 DIAGNOSIS — Z98.890 OTHER SPECIFIED POSTPROCEDURAL STATES: Chronic | ICD-10-CM

## 2018-12-19 PROCEDURE — 29827 SHO ARTHRS SRG RT8TR CUF RPR: CPT | Mod: RT

## 2018-12-19 PROCEDURE — 29823 SHO ARTHRS SRG XTNSV DBRDMT: CPT | Mod: RT

## 2018-12-19 RX ORDER — ONDANSETRON 8 MG/1
1 TABLET, FILM COATED ORAL
Qty: 16 | Refills: 0
Start: 2018-12-19 | End: 2018-12-22

## 2018-12-19 NOTE — ASU DISCHARGE PLAN (ADULT/PEDIATRIC). - NOTIFY
Fever greater than 101/Numbness, color, or temperature change to extremity/Persistent Nausea and Vomiting/Pain not relieved by Medications/Unable to Urinate/Swelling that continues/Bleeding that does not stop

## 2018-12-19 NOTE — ASU DISCHARGE PLAN (ADULT/PEDIATRIC). - MEDICATION SUMMARY - MEDICATIONS TO TAKE
I will START or STAY ON the medications listed below when I get home from the hospital:    Percocet 5/325 oral tablet  -- 1 tab(s) by mouth every 6 hours, As Needed  -- Indication: For post op     Aleve 220 mg oral tablet  -- 1 tab(s) by mouth every 8 hours, As Needed  -- Indication: For post op     Lipitor 10 mg oral tablet  -- 1 tab(s) by mouth once a day (at bedtime)  -- Indication: For home med    Multi-Day Plus Minerals oral tablet  -- 1 tab(s) by mouth once a day  -- Indication: For home med    Vitamin D3 5000 intl units oral tablet  -- 1 tab(s) by mouth 3 times a week  -- Indication: For home med I will START or STAY ON the medications listed below when I get home from the hospital:    Percocet 5/325 oral tablet  -- 1 tab(s) by mouth every 6 hours, As Needed  -- Indication: For post op     Aleve 220 mg oral tablet  -- 1 tab(s) by mouth every 8 hours, As Needed  -- Indication: For post op     ondansetron 4 mg oral tablet  -- 1 tab(s) by mouth every 6 hours, As Needed   -- Indication: For zofran    Lipitor 10 mg oral tablet  -- 1 tab(s) by mouth once a day (at bedtime)  -- Indication: For home med    Multi-Day Plus Minerals oral tablet  -- 1 tab(s) by mouth once a day  -- Indication: For home med    Vitamin D3 5000 intl units oral tablet  -- 1 tab(s) by mouth 3 times a week  -- Indication: For home med

## 2018-12-19 NOTE — BRIEF OPERATIVE NOTE - PROCEDURE
<<-----Click on this checkbox to enter Procedure Shoulder arthroscopy  12/19/2018  Rotator cuff calcium deposit removal and rotator cuff repair  Active  AMITA

## 2019-01-03 ENCOUNTER — APPOINTMENT (OUTPATIENT)
Dept: ORTHOPEDIC SURGERY | Facility: CLINIC | Age: 49
End: 2019-01-03
Payer: COMMERCIAL

## 2019-01-03 PROCEDURE — 99024 POSTOP FOLLOW-UP VISIT: CPT

## 2019-01-31 ENCOUNTER — APPOINTMENT (OUTPATIENT)
Dept: ORTHOPEDIC SURGERY | Facility: CLINIC | Age: 49
End: 2019-01-31
Payer: COMMERCIAL

## 2019-01-31 PROCEDURE — 99024 POSTOP FOLLOW-UP VISIT: CPT

## 2019-02-01 NOTE — HISTORY OF PRESENT ILLNESS
[6] : the patient reports pain that is 6/10 in severity [Clean/Dry/Intact] : clean, dry and intact [Healed] : healed [Neuro Intact] : an unremarkable neurological exam [Vascular Intact] : ~T peripheral vascular exam normal [Doing Well] : is doing well [No Sign of Infection] : is showing no signs of infection [Chills] : no chills [Fever] : no fever [Nausea] : no nausea [Vomiting] : no vomiting [Erythema] : not erythematous [Discharge] : absent of discharge [Swelling] : not swollen [Dehiscence] : not dehisced [Excellent Pain Control] : has excellent pain control [de-identified] : 48-year-old female status post right shoulder arthroscopy, calcium debridement, rotator cuff repair 12/19/18 [de-identified] : 48-year-old female status post right shoulder arthroscopy, calcium debridement, rotator cuff repair. She is doing well. She presents in brace. Attending PT 2 x per week. Utilizing NSAIDS. Denies post op complications.  [de-identified] : Right shoulder exam\par \par Inspection: No swelling\par Skin: Incisions C/D/I, no drainage, healed\par AROM: FF 90, ABD 70, ER 30, IR low lumbar\par PROM: , ABD 80, ER 30\par Painful arc ROM: Pain with further AROM/PROM\par Tenderness: Tenderness throughout the shoulder girdle\par Strength: 4+/5 IR, 4/5 ER with pain, 4/5 SS\par Vasc: 2+ radial pulse\par Neuro: AIN, PIN, Ulnar nerve in tact to motor\par Sensation: In tact to light touch throughout\par  [de-identified] : 48-year-old female status post right shoulder arthroscopy, calcium debridement, rotator cuff repair\par \par Continues to progress with range of motion function to the right shoulder. Patient has some hesitancy with active motion out of sling, but on examination has great passive/active motion at this time. Continued pain with rotator cuff strength testing given repair. NSAIDs have provided anti-inflammatory relief.\par \par Recommendation: Continue PT, HEP, NSAIDS, discontinue brace. \par \par Followup 6 weeks

## 2019-02-19 ENCOUNTER — TRANSCRIPTION ENCOUNTER (OUTPATIENT)
Age: 49
End: 2019-02-19

## 2019-02-28 NOTE — H&P PST ADULT - CARDIOVASCULAR COMMENTS
Sandra Crooks)  Pediatrics  [address] [place] [address], [place] [address]  Phone: [phone]  Fax: [phone]  Follow Up Time: 1-3 Days intermittent tachycardia, due to Anxiety

## 2019-03-08 ENCOUNTER — TRANSCRIPTION ENCOUNTER (OUTPATIENT)
Age: 49
End: 2019-03-08

## 2019-03-13 ENCOUNTER — APPOINTMENT (OUTPATIENT)
Dept: ORTHOPEDIC SURGERY | Facility: CLINIC | Age: 49
End: 2019-03-13
Payer: COMMERCIAL

## 2019-03-13 PROCEDURE — 99024 POSTOP FOLLOW-UP VISIT: CPT

## 2019-04-17 ENCOUNTER — OTHER (OUTPATIENT)
Age: 49
End: 2019-04-17

## 2019-05-13 NOTE — HISTORY OF PRESENT ILLNESS
[4] : the patient reports pain that is 4/10 in severity [Clean/Dry/Intact] : clean, dry and intact [Healed] : healed [Neuro Intact] : an unremarkable neurological exam [Vascular Intact] : ~T peripheral vascular exam normal [Doing Well] : is doing well [Excellent Pain Control] : has excellent pain control [No Sign of Infection] : is showing no signs of infection [Chills] : no chills [Fever] : no fever [Nausea] : no nausea [Vomiting] : no vomiting [Erythema] : not erythematous [Discharge] : absent of discharge [Swelling] : not swollen [Dehiscence] : not dehisced [de-identified] : 48-year-old female status post right shoulder arthroscopy, calcium debridement, rotator cuff repair 12/19/18 [de-identified] : 48-year-old female status post right shoulder arthroscopy, calcium debridement, rotator cuff repair. She is doing well.  Attending PT 2 x per week. Utilizing NSAIDS. Denies post op complications.  [de-identified] : Right shoulder exam\par \par Inspection: No swelling\par Skin: Incisions C/D/I, no drainage, healed\par AROM: , ABD 70, ER 30, IR low lumbar\par PROM: , ABD 80, ER 30\par Painful arc ROM: Min AROM/PROM\par Tenderness: Mild anterior\par Strength: 4+/5 IR, 4+/5 ER with pain, 4+/5 SS\par Vasc: 2+ radial pulse\par Neuro: AIN, PIN, Ulnar nerve in tact to motor\par Sensation: In tact to light touch throughout [de-identified] : 48-year-old female status post right shoulder arthroscopy, calcium debridement, rotator cuff repair\par \par Right shoulder arthroscopy with calcific deposit debridement. Range of motion is improving with physical therapy. Patient is comfortable. No current concerns.\par \par Recommendation: Continue PT, HEP, NSAIDS.\par \par Followup 3mos

## 2019-06-12 ENCOUNTER — APPOINTMENT (OUTPATIENT)
Dept: ULTRASOUND IMAGING | Facility: HOSPITAL | Age: 49
End: 2019-06-12

## 2019-06-20 ENCOUNTER — APPOINTMENT (OUTPATIENT)
Dept: ORTHOPEDIC SURGERY | Facility: CLINIC | Age: 49
End: 2019-06-20
Payer: COMMERCIAL

## 2019-06-20 DIAGNOSIS — M75.31 CALCIFIC TENDINITIS OF RIGHT SHOULDER: ICD-10-CM

## 2019-06-20 PROCEDURE — 99213 OFFICE O/P EST LOW 20 MIN: CPT

## 2019-08-13 ENCOUNTER — RX RENEWAL (OUTPATIENT)
Age: 49
End: 2019-08-13

## 2019-08-13 ENCOUNTER — MOBILE ON CALL (OUTPATIENT)
Age: 49
End: 2019-08-13

## 2019-08-13 DIAGNOSIS — Z87.440 PERSONAL HISTORY OF URINARY (TRACT) INFECTIONS: ICD-10-CM

## 2019-08-14 PROBLEM — M75.31 CALCIFIC TENDONITIS OF RIGHT SHOULDER: Status: ACTIVE | Noted: 2018-11-02

## 2019-08-14 NOTE — DISCUSSION/SUMMARY
[de-identified] : 48-year-old female status post right shoulder arthroscopy, calcium debridement, rotator cuff repair\par \par Patient has some mild terminal restriction with forward flexion and external rotation. Otherwise doing well, and as noted significant treatment since last followup. Happy with her current function. \par \par Recommendation: Activity to tolerance, continue HEP/strengthening.\par \par Followup as needed

## 2019-08-14 NOTE — HISTORY OF PRESENT ILLNESS
[de-identified] : 49-year-old female status post right shoulder arthroscopy, calcium debridement, rotator cuff repair 12.19.18. She is doing well. She is exercising on her own reports improvement of ROM. Utilizing NSAIDS. Denies post op complications.

## 2019-08-14 NOTE — PHYSICAL EXAM
[de-identified] : Right shoulder exam\par \par Inspection: No swelling\par Skin: Incisions C/D/I, no drainage, healed\par AROM: , ABD 90, ER 60, IR mid lumbar\par PROM: Same\par Painful arc ROM: none \par Tenderness: None\par Strength: 5/5 IR, 4+/5 ER, 4+/5 SS\par Vasc: 2+ radial pulse\par Neuro: AIN, PIN, Ulnar nerve in tact to motor\par Sensation: In tact to light touch throughout.
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
## 2019-09-05 RX ORDER — ACETAMINOPHEN AND CODEINE 300; 30 MG/1; MG/1
300-30 TABLET ORAL
Qty: 20 | Refills: 0 | Status: DISCONTINUED | COMMUNITY
Start: 2017-10-13 | End: 2019-09-05

## 2019-09-05 RX ORDER — DEXTROAMPHETAMINE SACCHARATE, AMPHETAMINE ASPARTATE, DEXTROAMPHETAMINE SULFATE, AND AMPHETAMINE SULFATE 3.75; 3.75; 3.75; 3.75 MG/1; MG/1; MG/1; MG/1
TABLET ORAL
Refills: 0 | Status: DISCONTINUED | COMMUNITY
End: 2019-09-05

## 2019-09-05 RX ORDER — BETAMETHASONE DIPROPIONATE 0.5 MG/G
0.05 OINTMENT, AUGMENTED TOPICAL TWICE DAILY
Qty: 1 | Refills: 0 | Status: DISCONTINUED | COMMUNITY
Start: 2017-09-28 | End: 2019-09-05

## 2019-09-05 RX ORDER — CLINDAMYCIN PHOSPHATE 20 MG/G
2 CREAM VAGINAL
Qty: 40 | Refills: 0 | Status: DISCONTINUED | COMMUNITY
Start: 2017-06-03 | End: 2019-09-05

## 2019-09-05 RX ORDER — OXYCODONE AND ACETAMINOPHEN 5; 325 MG/1; MG/1
5-325 TABLET ORAL
Qty: 50 | Refills: 0 | Status: DISCONTINUED | COMMUNITY
Start: 2018-12-18 | End: 2019-09-05

## 2019-09-05 RX ORDER — DEXTROAMPHETAMINE SACCHARATE, AMPHETAMINE ASPARTATE MONOHYDRATE, DEXTROAMPHETAMINE SULFATE AND AMPHETAMINE SULFATE 3.75; 3.75; 3.75; 3.75 MG/1; MG/1; MG/1; MG/1
15 CAPSULE, EXTENDED RELEASE ORAL
Qty: 30 | Refills: 0 | Status: DISCONTINUED | COMMUNITY
Start: 2017-03-13 | End: 2019-09-05

## 2019-09-05 RX ORDER — DIAZEPAM 5 MG/1
5 TABLET ORAL
Qty: 12 | Refills: 0 | Status: DISCONTINUED | COMMUNITY
Start: 2017-02-19 | End: 2019-09-05

## 2019-09-05 RX ORDER — CIPROFLOXACIN HYDROCHLORIDE 500 MG/1
500 TABLET, FILM COATED ORAL
Qty: 14 | Refills: 0 | Status: DISCONTINUED | COMMUNITY
Start: 2019-08-13 | End: 2019-09-05

## 2019-09-05 RX ORDER — ERYTHROMYCIN 5 MG/G
5 OINTMENT OPHTHALMIC
Qty: 1 | Refills: 2 | Status: DISCONTINUED | COMMUNITY
Start: 2017-07-07 | End: 2019-09-05

## 2019-09-27 ENCOUNTER — APPOINTMENT (OUTPATIENT)
Dept: MAMMOGRAPHY | Facility: CLINIC | Age: 49
End: 2019-09-27
Payer: COMMERCIAL

## 2019-09-27 ENCOUNTER — OUTPATIENT (OUTPATIENT)
Dept: OUTPATIENT SERVICES | Facility: HOSPITAL | Age: 49
LOS: 1 days | End: 2019-09-27
Payer: COMMERCIAL

## 2019-09-27 ENCOUNTER — APPOINTMENT (OUTPATIENT)
Dept: ULTRASOUND IMAGING | Facility: CLINIC | Age: 49
End: 2019-09-27
Payer: COMMERCIAL

## 2019-09-27 DIAGNOSIS — K08.499 PARTIAL LOSS OF TEETH DUE TO OTHER SPECIFIED CAUSE, UNSPECIFIED CLASS: Chronic | ICD-10-CM

## 2019-09-27 DIAGNOSIS — Z98.890 OTHER SPECIFIED POSTPROCEDURAL STATES: Chronic | ICD-10-CM

## 2019-09-27 DIAGNOSIS — Z00.8 ENCOUNTER FOR OTHER GENERAL EXAMINATION: ICD-10-CM

## 2019-09-27 PROCEDURE — 77063 BREAST TOMOSYNTHESIS BI: CPT | Mod: 26

## 2019-09-27 PROCEDURE — 76641 ULTRASOUND BREAST COMPLETE: CPT | Mod: 26,50

## 2019-09-27 PROCEDURE — 77067 SCR MAMMO BI INCL CAD: CPT | Mod: 26

## 2019-09-27 PROCEDURE — 77066 DX MAMMO INCL CAD BI: CPT

## 2019-09-27 PROCEDURE — 77067 SCR MAMMO BI INCL CAD: CPT

## 2019-09-27 PROCEDURE — G0279: CPT

## 2019-09-27 PROCEDURE — 77063 BREAST TOMOSYNTHESIS BI: CPT

## 2019-11-14 ENCOUNTER — MOBILE ON CALL (OUTPATIENT)
Age: 49
End: 2019-11-14

## 2019-11-14 DIAGNOSIS — Z86.59 PERSONAL HISTORY OF OTHER MENTAL AND BEHAVIORAL DISORDERS: ICD-10-CM

## 2019-12-17 PROBLEM — Z86.59 HISTORY OF ATTENTION DEFICIT HYPERACTIVITY DISORDER (ADHD): Status: RESOLVED | Noted: 2017-03-13 | Resolved: 2019-12-17

## 2019-12-31 ENCOUNTER — TRANSCRIPTION ENCOUNTER (OUTPATIENT)
Age: 49
End: 2019-12-31

## 2020-02-25 NOTE — H&P PST ADULT - PAIN CHRONIC, PROFILE
[facility] [phone]  [facility]  [place] [facility] [phone]    Pacemaker/Defibrillator Clinic          02/25/20        Marina Cardoso  [address] [place] [id number]        Dear Marina Cardoso    This letter is to inform you that we received the transmission from your monitor at home that checks your implanted heart device. The next date your monitor will automatically transmit will be 5-26-20. If your report needs attention we will notify you. Your device and monitor are wireless and most transmit cellularly, but please periodically check your monitor is still plugged in to the electrical outlet. If you still use the telephone land line to send please ensure the connection to the phone yaron is secure. This will help to ensure successful automatic transmissions in the future. Also, the monitor needs to be close to you while sleeping at night. Please be aware that the remote device transmission sites are periodically monitored only during regular business hours during which simultaneous in-office device clinics are being run. If your transmission requires attention, we will contact you as soon as possible. Thank you.             Sarthak [address]
no

## 2020-03-26 ENCOUNTER — TRANSCRIPTION ENCOUNTER (OUTPATIENT)
Age: 50
End: 2020-03-26

## 2020-03-28 ENCOUNTER — TRANSCRIPTION ENCOUNTER (OUTPATIENT)
Age: 50
End: 2020-03-28

## 2020-03-29 ENCOUNTER — NON-APPOINTMENT (OUTPATIENT)
Age: 50
End: 2020-03-29

## 2020-04-14 ENCOUNTER — APPOINTMENT (OUTPATIENT)
Dept: PULMONOLOGY | Facility: CLINIC | Age: 50
End: 2020-04-14
Payer: COMMERCIAL

## 2020-04-14 PROCEDURE — 99204 OFFICE O/P NEW MOD 45 MIN: CPT | Mod: 25,95

## 2020-04-14 RX ORDER — DOXYCYCLINE 100 MG/1
100 CAPSULE ORAL TWICE DAILY
Qty: 2 | Refills: 0 | Status: DISCONTINUED | COMMUNITY
Start: 2019-09-05 | End: 2020-04-14

## 2020-04-14 RX ORDER — LEVOFLOXACIN 750 MG/1
750 TABLET, FILM COATED ORAL
Qty: 7 | Refills: 0 | Status: DISCONTINUED | COMMUNITY
Start: 2019-11-15 | End: 2020-04-14

## 2020-04-14 NOTE — ASSESSMENT
[FreeTextEntry1] : Ms. GIL is a 50 year old female with a history of calcified tendinitis, acne, ADD, eczema, allergies, herniated disc  who was spoken to today via video call  for pulmonary evaluation, known COVID-19 positive / asthmatic bronchitis \par \par The patient's shortness of breath is multifactorial due to:\par -pulmonary disease \par      - COVID-19 infection / pneumonitis \par     - asthmatic bronchitis \par -poor breathing mechanics \par -overweight/out of shape (now)\par -(doubt)cardiac disease\par \par \par Problem 1: COVID-19 (+) / pneumonitis \par - s/p Zithromax x2, and  Plaquenil \par - continue monitor temperature curve \par Due to the short supply of COVID19 testing out right now- advised pt that under the advisement of the ID department at [facility]- the recommendation is that all patients with symptoms of suspected coronavirus no longer seek testing and treat symptoms at home while self-quarantined, as long as they are stable.\par Recommendations to patients with possible or confirmed COVID19:\par 1.   Stay home and away from public places. If you must go out, avoid using any kind of public transportation, ridesharing, or taxis.\par 2.   Monitor your symptoms carefully. If your symptoms get worse, call your healthcare provider immediately.\par 3.   Get rest and stay hydrated.\par 4.   Monitor temperature- treat with Tylenol 650 mg Q 6 hours up to 1000 mg TID-QID but not exceed 3500 mg daily for liver precautions. Avoid use of NSAIDs.\par 5.   Be sure to continue to take your maintenance medications for chronic conditions.\par As much as possible, stay in a specific room and away from other people in your home. Also, you should use a separate bathroom, if available. If you need to be around other people in or outside of the home, wear a facemask.\par If you develop emergency warning signs for serious complications for COVID-19 get medical attention immediately. Emergency warning signs include*:\par -Trouble breathing/worsening- unresolved SOB\par -Persistent pain or pressure in the chest\par -New confusion or inability to arouse\par -Bluish lips or face\par  -Worsening fatigue/malaise\par -Inability to drink/stay hydrated\par -High fever unresponsive to Tylenol\par Advised to keep us posted.\par Immune Support Recommendations:\par -OTC Vitamin C 500mg BID \par -OTC Quercetin 250-500mg BID \par -OTC Zinc 75-100mg per day \par -OTC Melatonin 1or 2mg a night \par -OTC Vitamin D 1-4000mg per day \par -OTC Tonic Water 8oz per day\par \par \par Problem 2: asthmatic bronchitis \par -Add a short course of Prednisone; 30 mg for 5 days, then 20 mg for 5 days, then 10 mg for 5 days. \par Information sheet given to the patient to be reviewed, this medication is never to be used without consulting the prescribing physician. Proper dietary restraint is necessary specifically salt containing foods, if any reaction may occur should be reported. \par - Add Singulair 10 mg before bed\par - Add Budesonide .5% BID \par - continue Albuterol (0.83) by nebulizer QID  (gargle and rinse after use)\par -continue Breo Ellipta 200 at 1 inhalation daily (after nebulizer)\par -Asthma is believed to be caused by inherited (genetic) and environmental factor, but its exact cause is unknown. Asthma may be triggered by allergens, lung infections, or irritants in the air. Asthma triggers are different for each person\par - You have a clinical scenario most c/q acute bronchitis the etiology of which is unknown but empiric antibiotics are indicated. Hydration, mucolytics including mucinex, robitussin and the like are indicated. Cough controlling agents will be needed.\par \par \par Problem 3: Allergies / sinus\par - Continue Zyrtec / Xyzal \par -continue Flonase 1 sniff each nostril BID\par -Environmental measures for allergies were encouraged including mattress and pillow cover, air purifier, and environmental controls.\par \par \par Problem 4: GERD\par -Add Pepcid 40 mg BID\par -Rule of 2s: avoid eating too much, eating too fast, eating too late, eating too spicy, eating too lousy, eating two hours before bed.\par -Things to avoid including overeating, spicy foods, tight clothing, eating within three hours of bed, this list is not all inclusive. \par -For treatment of reflux, possible options discussed including diet control, H2 blockers, PPIs, as well as coating motility agents discussed as treatment options. Timing of meals and proximity of last meal to sleep were discussed. If symptoms persist, a formal gastrointestinal evaluation is needed.\par \par Problem 5: Chronic Cough\par - continue Tessalon Perles \par - Any cough greater than three weeks duration-differential diagnosis includes-asthma, upper airway cough syndrome, post nasal drip syndrome, gastroesophageal reflux, laryngopharyngeal reflux, cardiac disease (congestive heart failure, medicines, effects, etc), medication effects (b-blockers, ace inhibitors, ARBs, glaucoma meds, etc.), smoking, infectious, multifactorial, etc.\par \par Problem 6: Poor Mechanics of Breathing\par - Proper breathing techniques were reviewed with an emphasis of exhalation. Patient instructed to breath in for 1 second and out for four seconds. Patient was encouraged to not talk while walking. \par \par Problem 7: Overweight / out of shape (now)\par -Weight loss, exercise, and diet control were discussed and are highly encouraged. Treatment options were given such as, aqua therapy, and contacting a nutritionist. Recommended to use the elliptical, stationary bike, less use of treadmill. Mindful eating was explained to the patient Obesity is associated with worsening asthma, shortness of breath, and potential for cardiac disease, diabetes, and other underlying medical conditions. \par \par \par \par Problem8 : health maintenance\par -s/p influenza vaccine\par -recommended strep pneumonia vaccines after age 65: Prevnar-13 vaccine, followed by Pneumo vaccine 23 one year following\par -recommended early intervention for URIs\par -recommended regular osteoporosis evaluations\par -recommended early dermatological evaluations\par -recommended after the age of 50 to the age of 70, colonoscopy every 5 years \par \par  Follow up in 6-8 weeks\par -he  is recommended to call with any changes, questions, or concerns.\par

## 2020-04-14 NOTE — HISTORY OF PRESENT ILLNESS
[Home] : at home, [unfilled] , at the time of the visit. [Medical Office: ([facility])___] : at ~his/her~ medical office located in [place] [Patient] : the patient [Self] : self [FreeTextEntry2] : JOE GIL [TextBox_4] : Ms. GIL is a 50 year old female with hx of calcified tendinitis, acne, ADD, eczema, who was spoken to today via video call for initial pulmonary evaluation. Her chief complaint is\par - she notes Wednesday the 25th of March she was at work and did not feel good, she went home and noted to have a fever. \par - She then developed a cough at night  she got tested and turned out positive for COVID and she stayed home. \par - she was given a zpack and ten days later she started feeling a little better. \par - She received plaquanil and another zithromax. \par - She notes she would wake up during her sleep. \par - She notes it was hard to walk up the stairs. \par - She notes she was afraid to go to sleep because her O2 was getting low. \par - Her  gave her breo as well but she notes shes still a little short of breathe every now and then. \par - she notes she used to have some respiratory issues as well. \par - she notes her asthma has been fine and her allergies were desensitized  but now her allergies have been getting worse\par - she notes she was bringing up some mucus with the cough but when she started the medications it stopped\par - she notes now her fevers have been fluctuating. Yesterday her fever was 101.7 F. \par - She notes she finished Plaquenil since friday. Right now shes using albuterol and breo. \par - she notes she gets hot flashes at night, she notes to have been taking a lot of NyQuil at night. \par - she notes her sinuses right now have been pretty good. \par - she notes shes been taking Zyrtec for her seasonal allergies. \par - she notes her appetite the last 2 days have been good. \par - she notes she has been sleeping with her mouth open lately. She does not know if she snores.\par - she notes shes been taking Pepcid to control her reflux since her asthma has been active lately. \par - she notes she has been wheezing lately. \par - she notes her energy levels have been low\par - she notes right now shes not bringing up any mucus. \par - she notes in her mid clavicular she notes some sharp discomfort when she takes a deep breath. \par -she denies any nausea, vomiting, chills, sweats, chest pain, chest pressure, diarrhea, constipation, dysphagia, dizziness, leg swelling, leg pain, itchy eyes, itchy ears, heartburn, sour taste in the mouth, myalgias or arthralgias.\par

## 2020-04-14 NOTE — REVIEW OF SYSTEMS
[Fever] : fever [Fatigue] : fatigue [Recent Wt Loss (___ Lbs)] : ~T recent [unfilled] lb weight loss [Negative] : Endocrine [Chills] : no chills [Poor Appetite] : no poor appetite

## 2020-04-14 NOTE — PHYSICAL EXAM
[No Acute Distress] : no acute distress [Well Nourished] : well nourished [Normal Appearance] : normal appearance [Well Groomed] : well groomed [No Deformities] : no deformities [Well Developed] : well developed [TextBox_2] : fatigued, otherwise looks well

## 2020-04-16 ENCOUNTER — NON-APPOINTMENT (OUTPATIENT)
Age: 50
End: 2020-04-16

## 2020-04-22 ENCOUNTER — NON-APPOINTMENT (OUTPATIENT)
Age: 50
End: 2020-04-22

## 2020-04-23 ENCOUNTER — NON-APPOINTMENT (OUTPATIENT)
Age: 50
End: 2020-04-23

## 2020-04-25 ENCOUNTER — MESSAGE (OUTPATIENT)
Age: 50
End: 2020-04-25

## 2020-05-04 ENCOUNTER — NON-APPOINTMENT (OUTPATIENT)
Age: 50
End: 2020-05-04

## 2020-08-13 ENCOUNTER — APPOINTMENT (OUTPATIENT)
Dept: ORTHOPEDIC SURGERY | Facility: CLINIC | Age: 50
End: 2020-08-13
Payer: COMMERCIAL

## 2020-08-13 VITALS — BODY MASS INDEX: 30.51 KG/M2 | HEIGHT: 69 IN | WEIGHT: 206 LBS

## 2020-08-13 VITALS — TEMPERATURE: 97.3 F

## 2020-08-13 PROCEDURE — 73030 X-RAY EXAM OF SHOULDER: CPT | Mod: LT

## 2020-08-13 PROCEDURE — 99214 OFFICE O/P EST MOD 30 MIN: CPT | Mod: 25

## 2020-08-13 PROCEDURE — 20610 DRAIN/INJ JOINT/BURSA W/O US: CPT | Mod: LT

## 2020-09-15 ENCOUNTER — NON-APPOINTMENT (OUTPATIENT)
Age: 50
End: 2020-09-15

## 2020-09-16 NOTE — PROCEDURE
[de-identified] : Injection: Left shoulder (Subacromial).\par Indication: Impingement.\par \par A discussion was had with the patient regarding this procedure and all questions were answered. All risks, benefits and alternatives were discussed. These included but were not limited to bleeding, infection, and allergic reaction. Alcohol was used to clean the skin, and betadine was used to sterilize and prep the area in the posterior aspect of the left shoulder. Ethyl chloride spray was then used as a topical anesthetic. A 21-gauge needle was used to inject 4cc of 1% lidocaine and 1cc of 40mg/ml methylprednisolone into the left subacromial space. A sterile bandage was then applied. The patient tolerated the procedure well and there were no complications.

## 2020-09-16 NOTE — DISCUSSION/SUMMARY
[de-identified] : 50-year-old female with left shoulder impingement\par \par Patient presents for evaluation left shoulder pain.  Patient was status post calcific tendinopathy debridement on the right shoulder, but there does not appear to be any significant calcium deposition disease on the left shoulder.  X-rays suggest that she may have some rotator cuff tendinopathy without evidence of significant calcific tendinitis.  Symptoms are mild, but recommendation was for inflammatory relief of symptoms with injection therapy as performed.\par \par Recommendation: Conservative care & observation, this includes rest/activity avoidance until less symptomatic with subsequent gradual return to full activity as tolerated. Patient may also use OTC NSAID's or acetaminophen as tolerated, with application of ice to the area 2-3x daily for 20 minutes after periods of activity. \par \par Follow-up if symptoms progress

## 2020-09-16 NOTE — HISTORY OF PRESENT ILLNESS
[de-identified] : 50 year old RHD female presents today with left shoulder pain since May 2020. No injury reported, and has concerns that it is similar to the contralateral side. Status post right shoulder arthroscopy, calcium debridement, rotator cuff repair in 2018. The pain is constant worse with abduction and internal rotation. Advil/Aleve is not helpful. C/O limited ROM due to pain. Denies numbness or tingling.

## 2020-09-16 NOTE — PHYSICAL EXAM
[de-identified] : \par The following radiographs were ordered and read by me during this patients visit. I reviewed each radiograph in detail with the patient and discussed the findings as highlighted below. \par \par 3 views of the left shoulder were obtained today that show no acute fracture or dislocation. There is no glenohumeral and no AC joint degenerative change seen. Type II acromion. There is no significant malalignment. No significant other obvious osseous abnormality, otherwise unremarkable. [de-identified] : Oriented to time, place, person\par Mood: Normal\par Affect: Normal\par Appearance: Healthy, well appearing, no acute distress.\par Gait: Normal\par Assistive Devices: None\par \par Left shoulder exam\par \par Inspection: No malalignment, No defects, No atrophy\par Skin: No masses, No lesions\par Neck: Negative Spurlings, full ROM, no pain with ROM\par AROM: FF to 180, abduction to 90, ER to 70, IR to mid lumbar\par PROM: F full\par Painful arc ROM: Pain with terminal motion\par Tenderness: No bicipital tenderness, no tenderness to the greater tuberosity/RTC insertion, no anterior shoulder/lesser tuberosity tenderness\par Strength: 5/5 ER, 5/5 IR in adduction, 5/5 supraspinatus testing, [default value] O'Briens test\par AC Joint: No ttp/pain with cross arm testing\par Biceps: Speed Negative, Yergusons Negative\par Impingement test: Positive Abraham, Positive Neer\par Stability : Stable\par Vasc: 2+ radial pulse\par Neuro: AIN, PIN, Ulnar nerve in tact to motor\par Sensation: In tact to light touch throughout\par

## 2020-10-06 ENCOUNTER — APPOINTMENT (OUTPATIENT)
Dept: ULTRASOUND IMAGING | Facility: HOSPITAL | Age: 50
End: 2020-10-06
Payer: COMMERCIAL

## 2020-10-06 ENCOUNTER — APPOINTMENT (OUTPATIENT)
Dept: MAMMOGRAPHY | Facility: HOSPITAL | Age: 50
End: 2020-10-06
Payer: COMMERCIAL

## 2020-10-06 ENCOUNTER — OUTPATIENT (OUTPATIENT)
Dept: OUTPATIENT SERVICES | Facility: HOSPITAL | Age: 50
LOS: 1 days | End: 2020-10-06
Payer: COMMERCIAL

## 2020-10-06 DIAGNOSIS — Z00.8 ENCOUNTER FOR OTHER GENERAL EXAMINATION: ICD-10-CM

## 2020-10-06 DIAGNOSIS — K08.499 PARTIAL LOSS OF TEETH DUE TO OTHER SPECIFIED CAUSE, UNSPECIFIED CLASS: Chronic | ICD-10-CM

## 2020-10-06 DIAGNOSIS — Z98.890 OTHER SPECIFIED POSTPROCEDURAL STATES: Chronic | ICD-10-CM

## 2020-10-06 PROCEDURE — 77063 BREAST TOMOSYNTHESIS BI: CPT | Mod: 26

## 2020-10-06 PROCEDURE — 76641 ULTRASOUND BREAST COMPLETE: CPT

## 2020-10-06 PROCEDURE — 77063 BREAST TOMOSYNTHESIS BI: CPT

## 2020-10-06 PROCEDURE — 76641 ULTRASOUND BREAST COMPLETE: CPT | Mod: 26,50

## 2020-10-06 PROCEDURE — 77067 SCR MAMMO BI INCL CAD: CPT | Mod: 26

## 2020-10-06 PROCEDURE — 77067 SCR MAMMO BI INCL CAD: CPT

## 2020-10-29 ENCOUNTER — APPOINTMENT (OUTPATIENT)
Dept: ORTHOPEDIC SURGERY | Facility: CLINIC | Age: 50
End: 2020-10-29
Payer: COMMERCIAL

## 2020-10-29 VITALS — TEMPERATURE: 98 F

## 2020-10-29 PROCEDURE — 99213 OFFICE O/P EST LOW 20 MIN: CPT

## 2020-10-29 PROCEDURE — 99072 ADDL SUPL MATRL&STAF TM PHE: CPT

## 2020-10-30 NOTE — DISCUSSION/SUMMARY
[de-identified] : 50-year-old female with left shoulder impingement\par \par Patient presents for evaluation of persistent left shoulder pain.  Likely underlying rotator cuff tendinopathy and symptoms of impingement.  Injection therapy provided good relief of symptoms until a few weeks ago.  Symptoms have since returned.  Discussed prior to further injection therapy, obtaining MRI imaging to determine degree of internal derangement and to stratify her for possible continued conservative management versus surgical intervention\par \par Recommendation: MRI imaging\par \par Follow-up after MRI

## 2020-10-30 NOTE — PHYSICAL EXAM
[de-identified] : Oriented to time, place, person\par Mood: Normal\par Affect: Normal\par Appearance: Healthy, well appearing, no acute distress.\par Gait: Normal\par Assistive Devices: None\par \par Left shoulder exam\par \par Inspection: No malalignment, No defects, No atrophy\par Skin: No masses, No lesions\par Neck: Negative Spurlings, full ROM, no pain with ROM\par AROM: FF to 180, abduction to 90, ER to 70, IR to mid lumbar\par PROM: F full\par Painful arc ROM: Pain with terminal motion\par Tenderness: No bicipital tenderness, no tenderness to the greater tuberosity/RTC insertion, no anterior shoulder/lesser tuberosity tenderness\par Strength: 5/5 ER, 5/5 IR in adduction, 5/5 supraspinatus testing, [default value] O'Briens test\par AC Joint: No ttp/pain with cross arm testing\par Biceps: Speed Negative, Yergusons Negative\par Impingement test: Positive Abraham, Positive Neer\par Stability : Stable\par Vasc: 2+ radial pulse\par Neuro: AIN, PIN, Ulnar nerve in tact to motor\par Sensation: In tact to light touch throughout\par  [de-identified] : 3 views of the left shoulder were obtained 8.13.2020 that show no acute fracture or dislocation. There is no glenohumeral and no AC joint degenerative change seen. Type II acromion. There is no significant malalignment. No significant other obvious osseous abnormality, otherwise unremarkable.

## 2020-10-30 NOTE — HISTORY OF PRESENT ILLNESS
[de-identified] : 50 year old RHD female presents today with return of left shoulder pain x 2 weeks. She received a cortisone injection at the last visit for impingement which gave her complete relief until 2 weeks ago. Stats that her pain returned after having a deep tissue massage.  Pain is brought on with movement of the arm. Meloxicam and Aleve is helpful. She has been compliant with HEP. Status post right shoulder arthroscopy, calcium debridement, rotator cuff repair in 2018.

## 2020-11-05 ENCOUNTER — APPOINTMENT (OUTPATIENT)
Dept: ORTHOPEDIC SURGERY | Facility: CLINIC | Age: 50
End: 2020-11-05
Payer: COMMERCIAL

## 2020-11-05 PROCEDURE — 99214 OFFICE O/P EST MOD 30 MIN: CPT

## 2020-11-05 PROCEDURE — 99072 ADDL SUPL MATRL&STAF TM PHE: CPT

## 2020-11-05 NOTE — PHYSICAL EXAM
[de-identified] : Oriented to time, place, person\par Mood: Normal\par Affect: Normal\par Appearance: Healthy, well appearing, no acute distress.\par Gait: Normal\par Assistive Devices: None\par \par Left shoulder exam\par \par Inspection: No malalignment, No defects, No atrophy\par Skin: No masses, No lesions\par Neck: Negative Spurlings, full ROM, no pain with ROM\par AROM: FF to 180, abduction to 90, ER to 70, IR to mid lumbar\par PROM: F full\par Painful arc ROM: Pain with terminal motion\par Tenderness: No bicipital tenderness, no tenderness to the greater tuberosity/RTC insertion, no anterior shoulder/lesser tuberosity tenderness\par Strength: 5/5 ER, 5/5 IR in adduction, 5/5 supraspinatus testing, [default value] O'Briens test\par AC Joint: No ttp/pain with cross arm testing\par Biceps: Speed Negative, Yergusons Negative\par Impingement test: Positive Abraham, Positive Neer\par Stability : Stable\par Vasc: 2+ radial pulse\par Neuro: AIN, PIN, Ulnar nerve in tact to motor\par Sensation: In tact to light touch throughout\par  [de-identified] : 3 views of the left shoulder were obtained 8.13.2020 that show no acute fracture or dislocation. There is no glenohumeral and no AC joint degenerative change seen. Type II acromion. There is no significant malalignment. No significant other obvious osseous abnormality, otherwise unremarkable.\par \par MRI of left shoulder 11/2/2020 shows mild calcium hydroxyapatite deposition along the infraspinatus tendon. Multifocal rotator cuff tendinosis with the insertional fraying/partial-thickness tearing of the infraspinatus tendon. Tear of the superior labrum which extends into the anterosuperior posterosuperior labrum. Mild subacromial-subdeltoild bursitis.

## 2020-11-05 NOTE — HISTORY OF PRESENT ILLNESS
[de-identified] : 50 year old RHD female presents today for follow up of left shoulder pain. She obtained MRI and is here for review of results. Pain has mildly improved since last visit.  She received a cortisone injection at the last visit for impingement which gave her complete relief until 2 weeks ago. States that her pain returned after having a deep tissue massage. Pain is brought on with movement of the arm. Meloxicam and Aleve is helpful. She has been compliant with HEP. Status post right shoulder arthroscopy, calcium debridement, rotator cuff repair in 2018.

## 2020-11-05 NOTE — DISCUSSION/SUMMARY
[de-identified] : 50-year-old female with left shoulder pain\par \par MRI of left shoulder 11/2/2020 shows small area of calcific tendinitis as well as some inflammatory bursitis/tendinitis throughout the subacromial space.  Discussed short-term and long-term outcomes as well as the goal of treatment to reduce pain and restore function. Nonsurgical treatment is typically first-line therapy that may take weeks to months to resolve symptoms; includes rest from overhead activities, NSAIDs, home exercise program versus physical therapy to restore normal strength/ROM/function of the shoulder, and possible corticosteroid injection. Also discussed the role of arthroscopic surgical intervention when nonsurgical treatment does not adequately relieve pain/inflammation. \par \par Recommendations: PT Rx given. Conservative modalities as above (overhead activity rest/activity avoidance until less symptomatic, ice, NSAIDs, home exercise strengthening and stretching program). \par \par Followup: If symptoms progress or persist for additional treatment as needed

## 2020-11-05 NOTE — ADDENDUM
[FreeTextEntry1] : This note was written by Shirley Byrne on 11/05/2020 acting solely as a scribe for Dr. Roman Davis.\par \par All medical record entries made by the Scribe were at my, Dr. Roman Davis, direction and personally dictated by me on 11/05/2020. I have personally reviewed the chart and agree that the record accurately reflects my personal performance of the history, physical exam, assessment and plan.

## 2020-11-10 ENCOUNTER — APPOINTMENT (OUTPATIENT)
Dept: PULMONOLOGY | Facility: CLINIC | Age: 50
End: 2020-11-10
Payer: COMMERCIAL

## 2020-11-10 VITALS
SYSTOLIC BLOOD PRESSURE: 110 MMHG | WEIGHT: 205 LBS | HEIGHT: 69 IN | HEART RATE: 116 BPM | TEMPERATURE: 97.7 F | BODY MASS INDEX: 30.36 KG/M2 | OXYGEN SATURATION: 98 % | RESPIRATION RATE: 17 BRPM | DIASTOLIC BLOOD PRESSURE: 70 MMHG

## 2020-11-10 PROCEDURE — 99214 OFFICE O/P EST MOD 30 MIN: CPT | Mod: 25

## 2020-11-10 PROCEDURE — 99072 ADDL SUPL MATRL&STAF TM PHE: CPT

## 2020-11-10 RX ORDER — CIPROFLOXACIN HYDROCHLORIDE 500 MG/1
500 TABLET, FILM COATED ORAL
Qty: 14 | Refills: 0 | Status: DISCONTINUED | COMMUNITY
Start: 2020-09-15 | End: 2020-11-10

## 2020-11-10 RX ORDER — PREDNISONE 10 MG/1
10 TABLET ORAL
Qty: 50 | Refills: 0 | Status: DISCONTINUED | COMMUNITY
Start: 2020-04-14 | End: 2020-11-10

## 2020-11-10 NOTE — HISTORY OF PRESENT ILLNESS
[TextBox_4] : Ms. GIL is a 50 year old female with hx of calcified tendinitis, acne, ADD, eczema, who presents to the office today for initial pulmonary evaluation. Her chief complaint is MAHER\par -she reports feeling generally well\par -she notes she can walk well with no SOB, but is unable to walk and talk. She becomes SOB when walking up stairs.\par -she notes her weight is stable\par -she reports she is getting enough sleep, getting 8 hours of sleep nightly\par -she states she wheezes when she walks\par -she notes her allergies and GERD are controlled with medication\par -she denies any coughing, chest pain, chest pressure, diarrhea, constipation, dysphagia, dizziness, sour taste in the mouth, heartburn, reflux

## 2020-11-10 NOTE — ASSESSMENT
[FreeTextEntry1] : Ms. GIL is a 50 year old female with a history of calcified tendinitis, acne, ADD, eczema, allergies, herniated disc  who presents to the office for pulmonary evaluation, s/p COVID-19 positive / asthmatic bronchitis (3/2020) - still SOB (MAHER)\par \par The patient's shortness of breath is multifactorial due to:\par -pulmonary disease \par      - COVID-19 infection / pneumonitis \par     - asthmatic bronchitis \par -poor breathing mechanics \par -overweight/out of shape (now)\par -(doubt)cardiac disease\par \par Problem 1: s/p COVID-19 (+) / pneumonitis (not completely symptomatically resolved)\par - s/p Zithromax x2, and  Plaquenil \par - continue monitor temperature curve \par Immune Support Recommendations:\par -OTC Vitamin C 500mg BID \par -OTC Quercetin 250-500mg BID \par -OTC Zinc 75-100mg per day \par -OTC Melatonin 1or 2mg a night \par -OTC Vitamin D 1-4000mg per day \par -OTC Tonic Water 8oz per day\par \par Problem 2: asthmatic bronchitis\par - Add Singulair 10 mg before bed\par -Add AirDuo 230 - 2 inhalations BID\par - continue Albuterol (0.83) by nebulizer QID  (gargle and rinse after use)\par -Asthma is believed to be caused by inherited (genetic) and environmental factor, but its exact cause is unknown. Asthma may be triggered by allergens, lung infections, or irritants in the air. Asthma triggers are different for each person\par - You have a clinical scenario most c/q acute bronchitis the etiology of which is unknown but empiric antibiotics are indicated. Hydration, mucolytics including mucinex, robitussin and the like are indicated. Cough controlling agents will be needed.\par \par \par Problem 3: Allergies / sinus\par - Continue Zyrtec / Xyzal \par -continue Flonase 1 sniff each nostril BID\par -Environmental measures for allergies were encouraged including mattress and pillow cover, air purifier, and environmental controls.\par \par \par Problem 4: GERD\par -continue Pepcid 40 mg QHS\par -Rule of 2s: avoid eating too much, eating too fast, eating too late, eating too spicy, eating too lousy, eating two hours before bed.\par -Things to avoid including overeating, spicy foods, tight clothing, eating within three hours of bed, this list is not all inclusive. \par -For treatment of reflux, possible options discussed including diet control, H2 blockers, PPIs, as well as coating motility agents discussed as treatment options. Timing of meals and proximity of last meal to sleep were discussed. If symptoms persist, a formal gastrointestinal evaluation is needed.\par \par Problem 5: Chronic Cough\par - continue Tessalon Perles \par - Any cough greater than three weeks duration-differential diagnosis includes-asthma, upper airway cough syndrome, post nasal drip syndrome, gastroesophageal reflux, laryngopharyngeal reflux, cardiac disease (congestive heart failure, medicines, effects, etc), medication effects (b-blockers, ace inhibitors, ARBs, glaucoma meds, etc.), smoking, infectious, multifactorial, etc.\par \par Problem 6: Poor Mechanics of Breathing\par - Proper breathing techniques were reviewed with an emphasis of exhalation. Patient instructed to breath in for 1 second and out for four seconds. Patient was encouraged to not talk while walking. \par \par Problem 7: Overweight / out of shape (now)\par -Weight loss, exercise, and diet control were discussed and are highly encouraged. Treatment options were given such as, aqua therapy, and contacting a nutritionist. Recommended to use the elliptical, stationary bike, less use of treadmill. Mindful eating was explained to the patient Obesity is associated with worsening asthma, shortness of breath, and potential for cardiac disease, diabetes, and other underlying medical conditions. \par \par \par \par Problem8 : health maintenance\par -s/p influenza vaccine 2020\par -recommended strep pneumonia vaccines after age 65: Prevnar-13 vaccine, followed by Pneumo vaccine 23 one year following\par -recommended early intervention for URIs\par -recommended regular osteoporosis evaluations\par -recommended early dermatological evaluations\par -recommended after the age of 50 to the age of 70, colonoscopy every 5 years \par \par  Follow up in 6-8 weeks\par -he  is recommended to call with any changes, questions, or concerns.\par

## 2020-11-10 NOTE — REVIEW OF SYSTEMS
[Wheezing] : wheezing [SOB on Exertion] : sob on exertion [Negative] : Endocrine [Cough] : no cough [Dyspnea] : no dyspnea [GERD] : no gerd [Diarrhea] : no diarrhea [Constipation] : no constipation [Dysphagia] : no dysphagia [Dizziness] : no dizziness

## 2020-11-10 NOTE — ADDENDUM
[FreeTextEntry1] : Documented by Betito More acting as a scribe for Dr. Damion Poole on 11/10/2020.\par \par All medical record entries made by the Scribe were at my, Dr. Damion Poole's, direction and personally dictated by me on 11/10/2020. I have reviewed the chart and agree that the record accurately reflects my personal performance of the history, physical exam, assessment and plan. I have also personally directed, reviewed, and agree with the discharge instructions.

## 2020-11-20 ENCOUNTER — APPOINTMENT (OUTPATIENT)
Dept: PULMONOLOGY | Facility: CLINIC | Age: 50
End: 2020-11-20
Payer: COMMERCIAL

## 2020-11-20 PROCEDURE — 94727 GAS DIL/WSHOT DETER LNG VOL: CPT

## 2020-11-20 PROCEDURE — 94729 DIFFUSING CAPACITY: CPT

## 2020-11-20 PROCEDURE — 94010 BREATHING CAPACITY TEST: CPT

## 2020-12-21 PROBLEM — Z87.440 HISTORY OF URINARY TRACT INFECTION: Status: RESOLVED | Noted: 2019-08-13 | Resolved: 2020-12-21

## 2021-01-16 ENCOUNTER — RX RENEWAL (OUTPATIENT)
Age: 51
End: 2021-01-16

## 2021-01-21 ENCOUNTER — APPOINTMENT (OUTPATIENT)
Dept: PULMONOLOGY | Facility: CLINIC | Age: 51
End: 2021-01-21
Payer: COMMERCIAL

## 2021-01-21 ENCOUNTER — LABORATORY RESULT (OUTPATIENT)
Age: 51
End: 2021-01-21

## 2021-01-21 ENCOUNTER — APPOINTMENT (OUTPATIENT)
Dept: ORTHOPEDIC SURGERY | Facility: CLINIC | Age: 51
End: 2021-01-21
Payer: COMMERCIAL

## 2021-01-21 VITALS
RESPIRATION RATE: 17 BRPM | HEIGHT: 69 IN | DIASTOLIC BLOOD PRESSURE: 78 MMHG | TEMPERATURE: 98 F | BODY MASS INDEX: 31.1 KG/M2 | HEART RATE: 73 BPM | WEIGHT: 210 LBS | SYSTOLIC BLOOD PRESSURE: 120 MMHG | OXYGEN SATURATION: 98 %

## 2021-01-21 VITALS — TEMPERATURE: 97.9 F

## 2021-01-21 DIAGNOSIS — M75.42 IMPINGEMENT SYNDROME OF LEFT SHOULDER: ICD-10-CM

## 2021-01-21 LAB
24R-OH-CALCIDIOL SERPL-MCNC: 56.9 PG/ML
25(OH)D3 SERPL-MCNC: 34.3 NG/ML
BASOPHILS # BLD AUTO: 0.05 K/UL
BASOPHILS NFR BLD AUTO: 0.7 %
EOSINOPHIL # BLD AUTO: 0.15 K/UL
EOSINOPHIL NFR BLD AUTO: 2.1 %
HCT VFR BLD CALC: 44.7 %
HGB BLD-MCNC: 14.5 G/DL
IMM GRANULOCYTES NFR BLD AUTO: 0.1 %
LYMPHOCYTES # BLD AUTO: 1.95 K/UL
LYMPHOCYTES NFR BLD AUTO: 27.9 %
MAN DIFF?: NORMAL
MCHC RBC-ENTMCNC: 31.7 PG
MCHC RBC-ENTMCNC: 32.4 GM/DL
MCV RBC AUTO: 97.6 FL
MONOCYTES # BLD AUTO: 0.43 K/UL
MONOCYTES NFR BLD AUTO: 6.2 %
NEUTROPHILS # BLD AUTO: 4.4 K/UL
NEUTROPHILS NFR BLD AUTO: 63 %
PLATELET # BLD AUTO: 413 K/UL
RBC # BLD: 4.58 M/UL
RBC # FLD: 11.9 %
WBC # FLD AUTO: 6.99 K/UL

## 2021-01-21 PROCEDURE — 20610 DRAIN/INJ JOINT/BURSA W/O US: CPT | Mod: LT

## 2021-01-21 PROCEDURE — 99214 OFFICE O/P EST MOD 30 MIN: CPT | Mod: 25

## 2021-01-21 PROCEDURE — 99072 ADDL SUPL MATRL&STAF TM PHE: CPT

## 2021-01-21 PROCEDURE — 99214 OFFICE O/P EST MOD 30 MIN: CPT

## 2021-01-21 NOTE — HISTORY OF PRESENT ILLNESS
[TextBox_4] : Ms. GIL is a 50 year old female with hx of calcified tendinitis, acne, ADD, eczema, who presents to the office today for initial pulmonary evaluation. Her chief complaint is \par -She notes being s/p Covid 19 vaccine x2\par -She notes feeling ill after the second vaccine \par -She notes her sleep is not good \par -She notes her energy level is not good\par -She notes not being to do more than walk for exercise\par -She notes cold air exacerbate her Sx\par -She notes feeling chest pain \par -She notes taking famotidine but did not work so she switched to omeprazole but still has not helped her reflux/heartburn\par -She notes having shoulder pain (orthopedic) on her left side \par -She notes her oxygen saturation drops to 90% at night\par -She notes snoring loudly \par -She notes her weight is stable\par -She denies palpitations \par -She notes wheezing a little bit \par -She notes coughing when she talks too long \par -She notes using Airduo, Singulair, Nasonex when congested, \par \par - denies any headaches, nausea, vomiting, fever, chills, sweats, chest pressure, diarrhea, constipation, dysphagia, dizziness, leg swelling, leg pain, itchy eyes, itchy ears, heartburn, reflux, or sour taste in the mouth.

## 2021-01-21 NOTE — PHYSICAL EXAM
[No Acute Distress] : no acute distress [Well Nourished] : well nourished [Normal Appearance] : normal appearance [Well Groomed] : well groomed [No Deformities] : no deformities [Well Developed] : well developed [III] : Mallampati Class: III [TextBox_68] : I:E ratio 1:3; clear

## 2021-01-21 NOTE — ASSESSMENT
[FreeTextEntry1] : Ms. GIL is a 50 year old female with a history of calcified tendinitis, acne, ADD, eczema, allergies, herniated disc  who presents to the office for pulmonary evaluation, s/p COVID-19 positive / asthmatic bronchitis (3/2020) - still SOB (MAHER)/ Reflux present - Post covid symptoms \par \par The patient's shortness of breath is multifactorial due to:\par -pulmonary disease \par      - COVID-19 infection / pneumonitis \par     - asthmatic bronchitis \par -poor breathing mechanics \par -overweight/out of shape (now)\par -(doubt)cardiac disease\par \par Problem 1: s/p COVID-19 (+) / pneumonitis (not completely symptomatically resolved)-\par - s/p Zithromax x2, and  Plaquenil \par - continue monitor temperature curve \par Immune Support Recommendations: \par -OTC Vitamin C 500mg BID \par -OTC Quercetin 250-500mg BID \par -OTC Zinc 75-100mg per day \par -OTC Melatonin 1or 2mg a night \par -OTC Vitamin D 1-4000mg per day \par -OTC Tonic Water 8oz per day\par \par Additional Supplements:\par -Liposomal glutathione\par -Berberine\par -SPM\par \par Problem 2: asthma (active)\par - Add Spiriva 2 qDay\par - Add Singulair 10 mg before bed\par -Add AirDuo 230 - 2 inhalations BID\par - continue Albuterol (0.83) by nebulizer QID  (gargle and rinse after use)\par -Asthma is believed to be caused by inherited (genetic) and environmental factor, but its exact cause is unknown. Asthma may be triggered by allergens, lung infections, or irritants in the air. Asthma triggers are different for each person\par - You have a clinical scenario most c/q acute bronchitis the etiology of which is unknown but empiric antibiotics are indicated. Hydration, mucolytics including mucinex, robitussin and the like are indicated. Cough controlling agents will be needed.\par \par \par Problem 3: Allergies / sinus\par - Continue Zyrtec / Xyzal \par -continue Flonase 1 sniff each nostril BID\par -get Blood work to include: asthma panel, food IgE panel, IgE level, eosinophil level, vitamin D level \par -Environmental measures for allergies were encouraged including mattress and pillow cover, air purifier, and environmental controls.\par \par \par Problem 4: GERD\par - add Omeprazole 40 mg QAM pre- breakfast \par -continue Pepcid 40 mg QHS\par -Rule of 2s: avoid eating too much, eating too fast, eating too late, eating too spicy, eating too lousy, eating two hours before bed.\par -Things to avoid including overeating, spicy foods, tight clothing, eating within three hours of bed, this list is not all inclusive. \par -For treatment of reflux, possible options discussed including diet control, H2 blockers, PPIs, as well as coating motility agents discussed as treatment options. Timing of meals and proximity of last meal to sleep were discussed. If symptoms persist, a formal gastrointestinal evaluation is needed.\par \par Problem 5: Chronic Cough\par - continue Tessalon Perles \par - Any cough greater than three weeks duration-differential diagnosis includes-asthma, upper airway cough syndrome, post nasal drip syndrome, gastroesophageal reflux, laryngopharyngeal reflux, cardiac disease (congestive heart failure, medicines, effects, etc), medication effects (b-blockers, ace inhibitors, ARBs, glaucoma meds, etc.), smoking, infectious, multifactorial, etc.\par \par Problem 6: Poor Mechanics of Breathing\par - Proper breathing techniques were reviewed with an emphasis of exhalation. Patient instructed to breath in for 1 second and out for four seconds. Patient was encouraged to not talk while walking. \par \par Problem 7: Overweight / out of shape (now)\par -Weight loss, exercise, and diet control were discussed and are highly encouraged. Treatment options were given such as, aqua therapy, and contacting a nutritionist. Recommended to use the elliptical, stationary bike, less use of treadmill. Mindful eating was explained to the patient Obesity is associated with worsening asthma, shortness of breath, and potential for cardiac disease, diabetes, and other underlying medical conditions. \par \par Problem 8: ?ELIDA\par -Complete Home Sleep Study\par - Sleep apnea is associated with adverse clinical consequences which can affect most organ systems. Cardiovascular disease risk includes arrhythmias, atrial fibrillation, hypertension, coronary artery disease, and stroke. Metabolic disorders include diabetes type 2, non-alcoholic fatty liver disease. Mood disorder especially depression; and cognitive decline especially in the elderly. Associations with chronic reflux/Danielson’s esophagus some but not all inclusive. \par -Reasons include arousal consistent with hypopnea; respiratory events most prominent in REM sleep or supine position; therefore sleep staging and body position are important for accurate diagnosis and estimation of AHI. \par \par \par  \par \par \par \par Problem 9: health maintenance\par - Covid 19 Vaccine (Pfizer) 2020\par -s/p influenza vaccine 2020\par -recommended strep pneumonia vaccines after age 65: Prevnar-13 vaccine, followed by Pneumo vaccine 23 one year following\par -recommended early intervention for URIs\par -recommended regular osteoporosis evaluations\par -recommended early dermatological evaluations\par -recommended after the age of 50 to the age of 70, colonoscopy every 5 years \par \par  Follow up in 6-8 weeks\par -he  is recommended to call with any changes, questions, or concerns.\par

## 2021-01-21 NOTE — ADDENDUM
[FreeTextEntry1] : Documented by Alfredito Hernandez acting as a scribe for Dr. Damion Poole on (01/21/2021).\par \par All medical record entries made by the Scribe were at my, Dr. Damion Poole's, direction and personally dictated by me on (01/21/2021). I have reviewed the chart and agree that the record accurately reflects my personal performance of the history, physical exam, assessment and plan. I have also personally directed, reviewed, and agree with the discharge instructions.

## 2021-01-22 PROBLEM — M75.42 SHOULDER IMPINGEMENT, LEFT: Status: ACTIVE | Noted: 2020-09-16

## 2021-01-22 NOTE — HISTORY OF PRESENT ILLNESS
[de-identified] : 50 year old RHD female presents today for follow up of left shoulder RTC tendinopathy. Pain got slightly better after the last visit but its back to baseline now. MRI of left shoulder 11/2/2020 showed mild calcium hydroxyapatite deposition along the infraspinatus tendon. She is here for another cortisone injection.  She received a cortisone injection 8/13/20 which gave her complete relief for a few months.  Pain is brought on with movement of the arm. Meloxicam and Aleve is helpful. She has been compliant with HEP. Status post right shoulder arthroscopy, calcium debridement, rotator cuff repair in 2018.  Patient would consider surgery if she fails any further conservative management.

## 2021-01-22 NOTE — ADDENDUM
[FreeTextEntry1] : This note was written by Shirley Byrne on 01/21/2021 acting solely as a scribe for Dr. Roman Davis.\par \par All medical record entries made by the Scribe were at my, Dr. Roman Davis, direction and personally dictated by me on 01/21/2021. I have personally reviewed the chart and agree that the record accurately reflects my personal performance of the history, physical exam, assessment and plan.

## 2021-01-22 NOTE — DISCUSSION/SUMMARY
[de-identified] : 50-year-old female with left shoulder impingement/rotator cuff tendinopathy\par \par Presents with return of left shoulder pain.  MRI showed small foci of calcium deposition within infraspinatus tendon.  Prior injection has provided relief, and presents today for additional treatment.  I outlined further injection therapy was provided for therapeutic and symptomatic relief of current symptoms. I discussed that cortisone injection is not a long term solution and that surgical debridement could be warranted if symptoms return and persists. \par \par Recommendations: Continue PT Rx given. Conservative modalities as above (overhead activity rest/activity avoidance until less symptomatic, ice, NSAIDs, home exercise strengthening and stretching program). \par \par Followup: If symptoms progress or persist for possible surgical decompression left shoulder.

## 2021-01-22 NOTE — PROCEDURE
[de-identified] : Injection: Left shoulder (Subacromial).\par Indication: Bursitis. \par \par A discussion was had with the patient regarding this procedure and all questions were answered. All risks, benefits and alternatives were discussed. These included but were not limited to bleeding, infection, and allergic reaction. Alcohol was used to clean the skin, and betadine was used to sterilize and prep the area in the posterior aspect of the left shoulder. Ethyl chloride spray was then used as a topical anesthetic. A 21-gauge needle was used to inject 4cc of 1% lidocaine and 1cc of 40mg/ml methylprednisolone into the left subacromial space. A sterile bandage was then applied. The patient tolerated the procedure well and there were no complications.

## 2021-01-22 NOTE — PHYSICAL EXAM
[de-identified] : Oriented to time, place, person\par Mood: Normal\par Affect: Normal\par Appearance: Healthy, well appearing, no acute distress.\par Gait: Normal\par Assistive Devices: None\par \par Left shoulder exam\par \par Inspection: No malalignment, No defects, No atrophy\par Skin: No masses, No lesions\par Neck: Negative Spurlings, full ROM, no pain with ROM\par AROM: FF to 180, abduction to 90, ER to 70, IR to mid lumbar\par PROM:FF\par Painful arc ROM: Pain with terminal motion\par Tenderness: No bicipital tenderness, no tenderness to the greater tuberosity/RTC insertion, no anterior shoulder/lesser tuberosity tenderness\par Strength: 5/5 ER, 5/5 IR in adduction, 5/5 supraspinatus testing, negative O'Briens test\par AC Joint: No ttp/pain with cross arm testing\par Biceps: Speed Negative, Yergusons Negative\par Impingement test: Positive Abraham, Positive Neer\par Stability : Stable\par Vasc: 2+ radial pulse\par Neuro: AIN, PIN, Ulnar nerve in tact to motor\par Sensation: In tact to light touch throughout\par  [de-identified] : 3 views of the left shoulder were obtained 8.13.2020 that show no acute fracture or dislocation. There is no glenohumeral and no AC joint degenerative change seen. Type II acromion. There is no significant malalignment. No significant other obvious osseous abnormality, otherwise unremarkable.\par \par MRI of left shoulder 11/2/2020 shows mild calcium hydroxyapatite deposition along the infraspinatus tendon. Multifocal rotator cuff tendinosis with the insertional fraying/partial-thickness tearing of the infraspinatus tendon. Tear of the superior labrum which extends into the anterosuperior posterosuperior labrum. Mild subacromial-subdeltoild bursitis.

## 2021-01-23 ENCOUNTER — NON-APPOINTMENT (OUTPATIENT)
Age: 51
End: 2021-01-23

## 2021-01-26 LAB
A ALTERNATA IGE QN: <0.1 KUA/L
A FUMIGATUS IGE QN: <0.1 KUA/L
C ALBICANS IGE QN: <0.1 KUA/L
C HERBARUM IGE QN: <0.1 KUA/L
CAT DANDER IGE QN: <0.1 KUA/L
COMMON RAGWEED IGE QN: <0.1 KUA/L
D FARINAE IGE QN: <0.1 KUA/L
D PTERONYSS IGE QN: <0.1 KUA/L
DEPRECATED A ALTERNATA IGE RAST QL: 0
DEPRECATED A FUMIGATUS IGE RAST QL: 0
DEPRECATED C ALBICANS IGE RAST QL: 0
DEPRECATED C HERBARUM IGE RAST QL: 0
DEPRECATED CAT DANDER IGE RAST QL: 0
DEPRECATED COMMON RAGWEED IGE RAST QL: 0
DEPRECATED D FARINAE IGE RAST QL: 0
DEPRECATED D PTERONYSS IGE RAST QL: 0
DEPRECATED DOG DANDER IGE RAST QL: 0
DEPRECATED M RACEMOSUS IGE RAST QL: 0
DEPRECATED ROACH IGE RAST QL: 0
DEPRECATED TIMOTHY IGE RAST QL: 0
DEPRECATED WHITE OAK IGE RAST QL: 0
DOG DANDER IGE QN: <0.1 KUA/L
M RACEMOSUS IGE QN: <0.1 KUA/L
ROACH IGE QN: <0.1 KUA/L
TIMOTHY IGE QN: <0.1 KUA/L
TOTAL IGE SMQN RAST: 31 KU/L
WHITE OAK IGE QN: <0.1 KUA/L

## 2021-01-27 LAB
CLAM IGE QN: <0.1 KUA/L
CODFISH IGE QN: <0.1 KUA/L
CORN IGE QN: <0.1 KUA/L
COW MILK IGE QN: <0.1 KUA/L
DEPRECATED CLAM IGE RAST QL: 0
DEPRECATED CODFISH IGE RAST QL: 0
DEPRECATED CORN IGE RAST QL: 0
DEPRECATED COW MILK IGE RAST QL: 0
DEPRECATED EGG WHITE IGE RAST QL: 0
DEPRECATED PEANUT IGE RAST QL: 0
DEPRECATED SCALLOP IGE RAST QL: <0.1 KUA/L
DEPRECATED SESAME SEED IGE RAST QL: 0
DEPRECATED SHRIMP IGE RAST QL: 0
DEPRECATED SOYBEAN IGE RAST QL: 0
DEPRECATED WALNUT IGE RAST QL: 0
DEPRECATED WHEAT IGE RAST QL: 0
EGG WHITE IGE QN: <0.1 KUA/L
PEANUT IGE QN: <0.1 KUA/L
SCALLOP IGE QN: 0
SCALLOP IGE QN: <0.1 KUA/L
SESAME SEED IGE QN: <0.1 KUA/L
SOYBEAN IGE QN: <0.1 KUA/L
WALNUT IGE QN: <0.1 KUA/L
WHEAT IGE QN: <0.1 KUA/L

## 2021-01-31 ENCOUNTER — NON-APPOINTMENT (OUTPATIENT)
Age: 51
End: 2021-01-31

## 2021-02-11 ENCOUNTER — NON-APPOINTMENT (OUTPATIENT)
Age: 51
End: 2021-02-11

## 2021-02-16 ENCOUNTER — APPOINTMENT (OUTPATIENT)
Dept: PULMONOLOGY | Facility: CLINIC | Age: 51
End: 2021-02-16
Payer: COMMERCIAL

## 2021-02-16 PROCEDURE — 99214 OFFICE O/P EST MOD 30 MIN: CPT | Mod: 95

## 2021-02-16 NOTE — DISCUSSION/SUMMARY
[FreeTextEntry1] : Chandanax HST shows:\par 1/29/21; RDI of 12.2, SPO2 <90% for 0.1 mins, HR Range of 63-99 BPM. \par 1/30/21: RDI of 11.9; SPO2 <90% for 0.9 mins, HR Range of  BPM. \par \par Oral appliances are safe, noninvasive and highly effective for treating snoring and varying severities of obstructive sleep apnea. The oral devices are designed to position the lower jaw slightly forward and down. This opens the airway. These devices are simple, portable, and silent, but they can be almost as expensive as CPAP. This option is not as suitable with those that have moderate to severe sleep apnea as well as central or complex sleep apnea. I did discuss the need for a follow up sleep study once the oral appliance was made and fit for comfort to actively assess how well it is treating the condition. \par \par \par

## 2021-02-16 NOTE — ASSESSMENT
[FreeTextEntry1] : Ms. GIL is a 50 year old female with a history of calcified tendinitis, acne, ADD, eczema, allergies, herniated disc who presents to the office for pulmonary evaluation, s/p COVID-19 positive / asthmatic bronchitis (3/2020) - still SOB (MAHER)/ Reflux present - Post covid symptoms.\par \par 1. Asthma:\par - Continue Spiriva 2 inhalations Q AM. \par - Increase AirDuo from 113 mcg to the 230 mcg - 1 inhalations BID - rinse and gargle post use. \par - Continue Singulair 10 mg before bed - she notes tremendous difference\par \par \par 2. ELIDA: \par - I have discussed all the negative health consequences associated with obstructive and central sleep apnea including heart conditions/MI, hypertension, diabetes, chronic inflammation, memory issues, stroke, obesity, decreased libido, sleep related accidents, as well as anxiety and depression. Additional recommendations included: Avoid alcohol and sedatives at bedtime. Proper sleep hygiene such as maintaining a regular sleep routine, avoiding naps if possible, not watching TV or reading in bed,  and maintaining a quiet, comfortable bedroom. Sleepy driving avoidance and risks discussed with patient. Diet, exercise and weight loss suggested.\par - RX for DD in EMR.  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 Patient notes she will contact her DDS, Dr. Betina Swanson to see if she makes Oral Appliance for ELIDA.\par - Patient given contact information for Dr. Kelsey Aguilar DDS. \par \par 3. GERD: \par - add Omeprazole 40 mg QAM pre- breakfast \par -continue Pepcid 40 mg QHS\par \par Patient to follow up with Dr. Poole as scheduled.\par Patient to call with further questions and concerns.\par Patient verbalizes understanding of care plan and is agreeable.\par \par \par \par \par

## 2021-02-16 NOTE — REASON FOR VISIT
[Home] : at home, [unfilled] , at the time of the visit. [Medical Office: ([facility])___] : at the medical office located in  [Verbal consent obtained from patient] : the patient, [unfilled] [Follow-Up] : a follow-up visit [Sleep Evaluation] : sleep evaluation [Asthma] : asthma

## 2021-02-16 NOTE — HISTORY OF PRESENT ILLNESS
[TextBox_4] : Ms. GIL is a 51 year old female with hx of calcified tendinitis, acne, ADD, eczema, who presents to the office today for initial pulmonary evaluation. \par \par Her chief concern is discussing her HST results. \par \par Patient admits to snoring when she has 2 glasses with dinner, which she notes is not often.  She goes to sleep at 10 PM and awakens at 7/7:30 - she admits to nonrestorative sleep.  She c/o frequent nocturnal awakening about 2-3 times.  She states she used to wake up once per night, but since Covid-19 (3/25/2020) she has been awakening more frequently.  She notes she is able to fall asleep everywhere - she notes this has been occurring for 15 years. \par \par She states she has felt a tremendous benefit with using Sprivia.  She states that the Airduo that was sent over was supposed to be a higher dose.  \par \par She denies awakening with dry mouth or AM Headache.  \par She denies fever/chills, decreased appetite, chest tightness, wheezing or any other complaints at this time. \par \par

## 2021-02-16 NOTE — PHYSICAL EXAM
[No Acute Distress] : no acute distress [Well Nourished] : well nourished [No Deformities] : no deformities [No Resp Distress] : no resp distress [Oriented x3] : oriented x3 [Normal Affect] : normal affect [TextBox_11] : Unable to evaluate d/t video visit.  [TextBox_44] : Unable to evaluate d/t video visit.  [TextBox_68] : Unable to evaluate d/t video visit.  [TextBox_54] : Unable to evaluate d/t video visit.  [TextBox_80] : Unable to evaluate d/t video visit.  [TextBox_89] : Unable to evaluate d/t video visit.  [TextBox_99] : Unable to evaluate d/t video visit.  [TextBox_105] : Unable to evaluate d/t video visit.  [TextBox_125] : Unable to evaluate d/t video visit.  [TextBox_132] : Unable to evaluate d/t video visit.

## 2021-04-20 ENCOUNTER — APPOINTMENT (OUTPATIENT)
Dept: PULMONOLOGY | Facility: CLINIC | Age: 51
End: 2021-04-20
Payer: COMMERCIAL

## 2021-04-20 ENCOUNTER — NON-APPOINTMENT (OUTPATIENT)
Age: 51
End: 2021-04-20

## 2021-04-20 VITALS
HEART RATE: 86 BPM | OXYGEN SATURATION: 97 % | DIASTOLIC BLOOD PRESSURE: 80 MMHG | BODY MASS INDEX: 31.1 KG/M2 | RESPIRATION RATE: 16 BRPM | WEIGHT: 210 LBS | SYSTOLIC BLOOD PRESSURE: 110 MMHG | TEMPERATURE: 98 F | HEIGHT: 69 IN

## 2021-04-20 PROCEDURE — 95012 NITRIC OXIDE EXP GAS DETER: CPT

## 2021-04-20 PROCEDURE — 99214 OFFICE O/P EST MOD 30 MIN: CPT | Mod: 25

## 2021-04-20 PROCEDURE — 94010 BREATHING CAPACITY TEST: CPT

## 2021-04-20 PROCEDURE — 99072 ADDL SUPL MATRL&STAF TM PHE: CPT

## 2021-04-20 NOTE — REASON FOR VISIT
[Follow-Up] : a follow-up visit [TextBox_44] : s/p COVID-19 positive, SOB, wheeze, chronic cough, GERD, RADS

## 2021-04-20 NOTE — HISTORY OF PRESENT ILLNESS
[TextBox_4] : Ms. GIL is a 51 year old female with hx of calcified tendinitis, acne, ADD, eczema, who presents to the office today for pulmonary follow up. Her chief complaint is \par \par -she notes generally feeling improved\par -she notes energy levels stable and improving\par -she notes dental device being fit, s/p oral appliance Titration Dr. Aguilar\par -she notes regular bowel movements \par -she notes reflux controlled\par -she denies palpitations\par -she denies visual issues\par -she notes weight is stable\par -she notes increased exercise walking with pace\par -she notes allergies quiet\par -she notes weight stable, looking to improve\par -she denies ankle swelling\par -she notes diet stable\par \par -denies any chest pain, chest pressure, diarrhea, constipation, dysphagia, sour taste in the mouth, dizziness, leg swelling, leg pain, myalgias, arthralgias, itchy eyes, itchy ears, heartburn.\par \par

## 2021-04-20 NOTE — ADDENDUM
[FreeTextEntry1] : Documented by Donnie Johnson acting as a scribe for Dr. Damion Poole on 04/20/2021.\par \par All medical record entries made by the Scribe were at my, Dr. Damion Poole's, direction and personally dictated by me on 04/20/2021 . I have reviewed the chart and agree that the record accurately reflects my personal performance of the history, physical exam, assessment and plan. I have also personally directed, reviewed, and agree with the discharge instructions. \par

## 2021-04-20 NOTE — ASSESSMENT
[FreeTextEntry1] : Ms. GIL is a 51 year old female with a history of calcified tendinitis, acne, ADD, eczema, allergies, herniated disc  who presents to the office for pulmonary follow up, s/p COVID-19 positive / asthmatic bronchitis (3/2020) - still SOB (MAHER)/ Reflux present - Post covid symptoms - resolved- DX OSAS- pending Rx\par \par The patient's shortness of breath is multifactorial due to:\par -pulmonary disease \par      - COVID-19 infection / pneumonitis \par     - asthmatic bronchitis \par -poor breathing mechanics \par -overweight/out of shape (now)\par -(doubt)cardiac disease\par \par Problem 1: s/p COVID-19 (+) / pneumonitis (not completely symptomatically resolved)-\par - s/p Zithromax x2, and  Plaquenil \par - continue monitor temperature curve \par Immune Support Recommendations: \par -OTC Vitamin C 500mg BID \par -OTC Quercetin 250-500mg BID \par -OTC Zinc 75-100mg per day \par -OTC Melatonin 1or 2mg a night \par -OTC Vitamin D 1-4000mg per day \par -OTC Tonic Water 8oz per day\par \par Additional Supplements:\par -Liposomal glutathione\par -Berberine\par -SPM\par \par Problem 2: asthma (controlled) \par - continue Spiriva 2 qDay\par - continue Singulair 10 mg before bed\par -continue AirDuo 230 - 2 inhalations BID\par - continue Albuterol (0.83) by nebulizer QID  (gargle and rinse after use)\par -Asthma is believed to be caused by inherited (genetic) and environmental factor, but its exact cause is unknown. Asthma may be triggered by allergens, lung infections, or irritants in the air. Asthma triggers are different for each person\par - You have a clinical scenario most c/q acute bronchitis the etiology of which is unknown but empiric antibiotics are indicated. Hydration, mucolytics including mucinex, robitussin and the like are indicated. Cough controlling agents will be needed.\par \par Problem 3: Allergies / sinus\par - Continue Zyrtec / Xyzal \par -continue Flonase 1 sniff each nostril BID\par -get Blood work to include: asthma panel, food IgE panel, IgE level, eosinophil level, vitamin D level \par -Environmental measures for allergies were encouraged including mattress and pillow cover, air purifier, and environmental controls.\par \par Problem 4: GERD\par - continue Omeprazole 40 mg QAM pre- breakfast \par -continue Pepcid 40 mg QHS\par -Rule of 2s: avoid eating too much, eating too fast, eating too late, eating too spicy, eating too lousy, eating two hours before bed.\par -Things to avoid including overeating, spicy foods, tight clothing, eating within three hours of bed, this list is not all inclusive. \par -For treatment of reflux, possible options discussed including diet control, H2 blockers, PPIs, as well as coating motility agents discussed as treatment options. Timing of meals and proximity of last meal to sleep were discussed. If symptoms persist, a formal gastrointestinal evaluation is needed.\par \par Problem 5: Chronic Cough (resolved) \par - continue Tessalon Perles \par - Any cough greater than three weeks duration-differential diagnosis includes-asthma, upper airway cough syndrome, post nasal drip syndrome, gastroesophageal reflux, laryngopharyngeal reflux, cardiac disease (congestive heart failure, medicines, effects, etc), medication effects (b-blockers, ace inhibitors, ARBs, glaucoma meds, etc.), smoking, infectious, multifactorial, etc.\par \par Problem 6: Poor Mechanics of Breathing\par -recommended breathing techniques Jenni Bedolla \par - Proper breathing techniques were reviewed with an emphasis of exhalation. Patient instructed to breath in for 1 second and out for four seconds. Patient was encouraged to not talk while walking. \par \par Problem 7: Overweight / out of shape (now)\par -Weight loss, exercise, and diet control were discussed and are highly encouraged. Treatment options were given such as, aqua therapy, and contacting a nutritionist. Recommended to use the elliptical, stationary bike, less use of treadmill. Mindful eating was explained to the patient Obesity is associated with worsening asthma, shortness of breath, and potential for cardiac disease, diabetes, and other underlying medical conditions. \par \par Problem 8: (+) mild ELIDA \par -s/p Home Sleep Study (12.2) \par -s/p Dr. Janice Aguilar evaluation for Oral Appliance Therapy (pending)\par - Sleep apnea is associated with adverse clinical consequences which can affect most organ systems. Cardiovascular disease risk includes arrhythmias, atrial fibrillation, hypertension, coronary artery disease, and stroke. Metabolic disorders include diabetes type 2, non-alcoholic fatty liver disease. Mood disorder especially depression; and cognitive decline especially in the elderly. Associations with chronic reflux/Danielson’s esophagus some but not all inclusive. \par -Reasons include arousal consistent with hypopnea; respiratory events most prominent in REM sleep or supine position; therefore sleep staging and body position are important for accurate diagnosis and estimation of AHI. \par \par Problem 9: health maintenance\par - Covid 19 Vaccine (Pfizer) 2020\par -s/p influenza vaccine 2020\par -recommended strep pneumonia vaccines after age 65: Prevnar-13 vaccine, followed by Pneumo vaccine 23 one year following\par -recommended early intervention for URIs\par -recommended regular osteoporosis evaluations\par -recommended early dermatological evaluations\par -recommended after the age of 50 to the age of 70, colonoscopy every 5 years \par \par  Follow up in 6-8 weeks\par -he  is recommended to call with any changes, questions, or concerns.\par

## 2021-04-20 NOTE — PROCEDURE
[FreeTextEntry1] : \par PFT revealed normal flows, with a FEV1 of 3.30L, which is 79% of predicted, normal lung volumes, and with a normal flow volume loop. \par \par FENO was 20; a normal value being less than 25\par Fractional exhaled nitric oxide (FENO) is regarded as a simple, noninvasive method for assessing eosinophilic airway inflammation. Produced by a variety of cells within the lung, nitric oxide (NO) concentrations are generally low in healthy individuals. However, high concentrations of NO appear to be involved in nonspecific host defense mechanisms and chronic inflammatory diseases such as asthma. The American Thoracic Society (ATS) therefore has recommended using FENO to aid in the diagnosis and monitoring of eosinophilic airway inflammation and asthma, and for identifying steroid responsive individuals whose chronic respiratory symptoms may be caused by airway inflammation. \par \par Sleep study (1.29.2021) revealed sleep apnea with an AHI/VIDYA of 12.2 and a low oxygen saturation of 89 %\par

## 2021-08-19 ENCOUNTER — RX RENEWAL (OUTPATIENT)
Age: 51
End: 2021-08-19

## 2021-08-19 ENCOUNTER — APPOINTMENT (OUTPATIENT)
Dept: PULMONOLOGY | Facility: CLINIC | Age: 51
End: 2021-08-19
Payer: COMMERCIAL

## 2021-08-19 VITALS
HEIGHT: 69 IN | SYSTOLIC BLOOD PRESSURE: 110 MMHG | RESPIRATION RATE: 16 BRPM | OXYGEN SATURATION: 98 % | TEMPERATURE: 97.2 F | HEART RATE: 89 BPM | DIASTOLIC BLOOD PRESSURE: 70 MMHG | WEIGHT: 205 LBS | BODY MASS INDEX: 30.36 KG/M2

## 2021-08-19 PROCEDURE — 99214 OFFICE O/P EST MOD 30 MIN: CPT

## 2021-08-19 NOTE — PROCEDURE
[FreeTextEntry1] : Sleep study (1.29.2021) revealed sleep apnea with an AHI/VIDYA of 12.2 and a low oxygen saturation of 89 %\par \par -Images and procedures reviewed in detail and discussed with patient.

## 2021-08-19 NOTE — HISTORY OF PRESENT ILLNESS
[TextBox_4] : Ms. GIL is a 51 year old female with hx of calcified tendinitis, acne, ADD, eczema, who presents to the office today for pulmonary follow up. Her chief complaint is \par -She notes her energy level is intermittent, fluctuation due to allergies \par -She notes the humidity does not affect her breathing severely\par -She notes sleeping well in general\par -She is s/p sleep study \par -She notes exercising on her [employer] frequently \par -She notes her recovery from exercise is good\par -She denies hoarseness\par -She notes her sinuses are clear\par -She notes taking her medication regularly\par -She notes losing 15lbs purposefully\par - S/p Covid 19 vaccine (Pfizer) x2 1/2021\par -She notes starting Weight watchers to lose weight\par \par - patient denies any headaches, nausea, vomiting, fever, chills, sweats, chest pain, chest pressure, palpitations, coughing, wheezing, fatigue, diarrhea, constipation, dysphagia, myalgias, dizziness, leg swelling, leg pain, itchy eyes, itchy ears, heartburn, reflux or sour taste in the mouth

## 2021-08-19 NOTE — ASSESSMENT
[FreeTextEntry1] : Ms. GIL is a 51 year old female with a history of calcified tendinitis, acne, ADD, eczema, allergies, herniated disc  who presents to the office for pulmonary follow up, s/p COVID-19 positive / asthmatic bronchitis (3/2020) - still SOB (MAHER)/ Reflux present - Post covid symptoms - resolved- DX OSAS- now 15lbs off\par \par The patient's shortness of breath is multifactorial due to:\par -pulmonary disease \par      - COVID-19 infection / pneumonitis \par     - asthmatic bronchitis \par -poor breathing mechanics \par -overweight/out of shape (now)\par -(doubt)cardiac disease\par \par Problem 1: s/p COVID-19 (+) / pneumonitis (not completely symptomatically resolved)-\par - S/p Covid 19 vaccine (Pfizer) x2 1/2021\par -Covid 19 vaccine discussed at length with patient; booster discussed\par - s/p Zithromax x2, and  Plaquenil \par - continue monitor temperature curve \par Immune Support Recommendations: \par -OTC Vitamin C 500mg BID \par -OTC Quercetin 250-500mg BID \par -OTC Zinc 75-100mg per day \par -OTC Melatonin 1or 2mg a night \par -OTC Vitamin D 1-4000mg per day \par -OTC Tonic Water 8oz per day\par \par Additional Supplements:\par -Liposomal glutathione\par -Berberine\par -SPM\par \par Problem 2: asthma (controlled) \par - continue Spiriva 2 qDay\par - continue Singulair 10 mg before bed\par -continue AirDuo 230 - 2 inhalations BID\par - continue Albuterol (0.83) by nebulizer QID  (gargle and rinse after use)\par -Asthma is believed to be caused by inherited (genetic) and environmental factor, but its exact cause is unknown. Asthma may be triggered by allergens, lung infections, or irritants in the air. Asthma triggers are different for each person\par - You have a clinical scenario most c/q acute bronchitis the etiology of which is unknown but empiric antibiotics are indicated. Hydration, mucolytics including mucinex, robitussin and the like are indicated. Cough controlling agents will be needed.\par \par Problem 3: Allergies / sinus\par - Continue Zyrtec / Xyzal \par -continue Flonase 1 sniff each nostril BID\par -get Blood work to include: asthma panel, food IgE panel, IgE level, eosinophil level, vitamin D level \par -Environmental measures for allergies were encouraged including mattress and pillow cover, air purifier, and environmental controls.\par \par Problem 4: GERD\par - continue Omeprazole 40 mg QAM pre- breakfast \par -continue Pepcid 40 mg QHS\par -Rule of 2s: avoid eating too much, eating too fast, eating too late, eating too spicy, eating too lousy, eating two hours before bed.\par -Things to avoid including overeating, spicy foods, tight clothing, eating within three hours of bed, this list is not all inclusive. \par -For treatment of reflux, possible options discussed including diet control, H2 blockers, PPIs, as well as coating motility agents discussed as treatment options. Timing of meals and proximity of last meal to sleep were discussed. If symptoms persist, a formal gastrointestinal evaluation is needed.\par \par Problem 5: Chronic Cough (resolved) \par - continue Tessalon Perles \par - Any cough greater than three weeks duration-differential diagnosis includes-asthma, upper airway cough syndrome, post nasal drip syndrome, gastroesophageal reflux, laryngopharyngeal reflux, cardiac disease (congestive heart failure, medicines, effects, etc), medication effects (b-blockers, ace inhibitors, ARBs, glaucoma meds, etc.), smoking, infectious, multifactorial, etc.\par \par Problem 6: Poor Mechanics of Breathing\par -recommended breathing techniques Jenni Bedolla \par - Proper breathing techniques were reviewed with an emphasis of exhalation. Patient instructed to breath in for 1 second and out for four seconds. Patient was encouraged to not talk while walking. \par \par Problem 7: Overweight / out of shape (now)\par -Weight loss, exercise, and diet control were discussed and are highly encouraged. Treatment options were given such as, aqua therapy, and contacting a nutritionist. Recommended to use the elliptical, stationary bike, less use of treadmill. Mindful eating was explained to the patient Obesity is associated with worsening asthma, shortness of breath, and potential for cardiac disease, diabetes, and other underlying medical conditions. \par \par Problem 8: (+) mild ELIDA \par -s/p Home Sleep Study (12.2) \par -s/p Dr. Janice Aguilar evaluation for Oral Appliance Therapy (pending)\par - Sleep apnea is associated with adverse clinical consequences which can affect most organ systems. Cardiovascular disease risk includes arrhythmias, atrial fibrillation, hypertension, coronary artery disease, and stroke. Metabolic disorders include diabetes type 2, non-alcoholic fatty liver disease. Mood disorder especially depression; and cognitive decline especially in the elderly. Associations with chronic reflux/Danielson’s esophagus some but not all inclusive. \par -Reasons include arousal consistent with hypopnea; respiratory events most prominent in REM sleep or supine position; therefore sleep staging and body position are important for accurate diagnosis and estimation of AHI. \par \par Problem 9: health maintenance\par -s/p influenza vaccine 2020\par -recommended strep pneumonia vaccines after age 65: Prevnar-13 vaccine, followed by Pneumo vaccine 23 one year following\par -recommended early intervention for URIs\par -recommended regular osteoporosis evaluations\par -recommended early dermatological evaluations\par -recommended after the age of 50 to the age of 70, colonoscopy every 5 years \par \par  Follow up in 6-8 weeks\par -he  is recommended to call with any changes, questions, or concerns.\par

## 2021-08-19 NOTE — PHYSICAL EXAM
[No Acute Distress] : no acute distress [Well Nourished] : well nourished [Normal Appearance] : normal appearance [No Deformities] : no deformities [Well Groomed] : well groomed [Well Developed] : well developed [III] : Mallampati Class: III [TextBox_68] : I:E ratio 1:3; clear

## 2021-08-19 NOTE — ADDENDUM
[FreeTextEntry1] : Documented by Alfredito Hernandez acting as a scribe for Dr. Damion Poole on (08/19/2021).\par \par All medical record entries made by the Scribe were at my, Dr. Damion Poole's, direction and personally dictated by me on (08/19/2021). I have reviewed the chart and agree that the record accurately reflects my personal performance of the history, physical exam, assessment and plan. I have also personally directed, reviewed, and agree with the discharge instructions.\par

## 2021-08-23 ENCOUNTER — RX RENEWAL (OUTPATIENT)
Age: 51
End: 2021-08-23

## 2021-10-08 ENCOUNTER — TRANSCRIPTION ENCOUNTER (OUTPATIENT)
Age: 51
End: 2021-10-08

## 2021-10-13 ENCOUNTER — LABORATORY RESULT (OUTPATIENT)
Age: 51
End: 2021-10-13

## 2021-10-18 ENCOUNTER — NON-APPOINTMENT (OUTPATIENT)
Age: 51
End: 2021-10-18

## 2021-10-18 LAB
25(OH)D3 SERPL-MCNC: 92.1 NG/ML
ALBUMIN SERPL ELPH-MCNC: 5 G/DL
ALP BLD-CCNC: 83 U/L
ALT SERPL-CCNC: 17 U/L
ANION GAP SERPL CALC-SCNC: 14 MMOL/L
APPEARANCE: ABNORMAL
AST SERPL-CCNC: 18 U/L
BASOPHILS # BLD AUTO: 0.05 K/UL
BASOPHILS NFR BLD AUTO: 0.9 %
BILIRUB SERPL-MCNC: 0.4 MG/DL
BILIRUBIN URINE: NEGATIVE
BLOOD URINE: NEGATIVE
BUN SERPL-MCNC: 15 MG/DL
CALCIUM SERPL-MCNC: 10.2 MG/DL
CHLORIDE SERPL-SCNC: 103 MMOL/L
CHOLEST SERPL-MCNC: 250 MG/DL
CO2 SERPL-SCNC: 24 MMOL/L
COLOR: YELLOW
CREAT SERPL-MCNC: 0.78 MG/DL
EOSINOPHIL # BLD AUTO: 0.08 K/UL
EOSINOPHIL NFR BLD AUTO: 1.5 %
ESTIMATED AVERAGE GLUCOSE: 117 MG/DL
GLUCOSE QUALITATIVE U: NEGATIVE
GLUCOSE SERPL-MCNC: 108 MG/DL
HBA1C MFR BLD HPLC: 5.7 %
HCT VFR BLD CALC: 43.5 %
HDLC SERPL-MCNC: 67 MG/DL
HGB BLD-MCNC: 14.6 G/DL
IMM GRANULOCYTES NFR BLD AUTO: 0.2 %
KETONES URINE: NEGATIVE
LDLC SERPL CALC-MCNC: 157 MG/DL
LEUKOCYTE ESTERASE URINE: NEGATIVE
LYMPHOCYTES # BLD AUTO: 2.05 K/UL
LYMPHOCYTES NFR BLD AUTO: 38 %
MAN DIFF?: NORMAL
MCHC RBC-ENTMCNC: 31.1 PG
MCHC RBC-ENTMCNC: 33.6 GM/DL
MCV RBC AUTO: 92.6 FL
MONOCYTES # BLD AUTO: 0.4 K/UL
MONOCYTES NFR BLD AUTO: 7.4 %
NEUTROPHILS # BLD AUTO: 2.81 K/UL
NEUTROPHILS NFR BLD AUTO: 52 %
NITRITE URINE: NEGATIVE
NONHDLC SERPL-MCNC: 184 MG/DL
PH URINE: 6
PLATELET # BLD AUTO: 357 K/UL
POTASSIUM SERPL-SCNC: 4.7 MMOL/L
PROT SERPL-MCNC: 7.1 G/DL
PROTEIN URINE: NEGATIVE
RBC # BLD: 4.7 M/UL
RBC # FLD: 12.9 %
SODIUM SERPL-SCNC: 140 MMOL/L
SPECIFIC GRAVITY URINE: 1.02
T4 FREE SERPL-MCNC: 1.3 NG/DL
TRIGL SERPL-MCNC: 134 MG/DL
TSH SERPL-ACNC: 2.22 UIU/ML
UROBILINOGEN URINE: NORMAL
VIT B12 SERPL-MCNC: 614 PG/ML
WBC # FLD AUTO: 5.4 K/UL

## 2021-10-19 ENCOUNTER — NON-APPOINTMENT (OUTPATIENT)
Age: 51
End: 2021-10-19

## 2021-10-19 ENCOUNTER — APPOINTMENT (OUTPATIENT)
Dept: INTERNAL MEDICINE | Facility: CLINIC | Age: 51
End: 2021-10-19
Payer: COMMERCIAL

## 2021-10-19 VITALS
WEIGHT: 199 LBS | HEIGHT: 68 IN | DIASTOLIC BLOOD PRESSURE: 80 MMHG | BODY MASS INDEX: 30.16 KG/M2 | OXYGEN SATURATION: 98 % | HEART RATE: 79 BPM | SYSTOLIC BLOOD PRESSURE: 110 MMHG | TEMPERATURE: 97.8 F

## 2021-10-19 PROCEDURE — 99386 PREV VISIT NEW AGE 40-64: CPT | Mod: 25

## 2021-10-19 PROCEDURE — 93000 ELECTROCARDIOGRAM COMPLETE: CPT | Mod: 59

## 2021-10-19 PROCEDURE — G0444 DEPRESSION SCREEN ANNUAL: CPT | Mod: NC,59

## 2021-10-19 RX ORDER — FAMOTIDINE 40 MG/1
40 TABLET, FILM COATED ORAL
Qty: 180 | Refills: 1 | Status: DISCONTINUED | COMMUNITY
Start: 2020-04-14 | End: 2021-10-19

## 2021-10-19 RX ORDER — CLOTRIMAZOLE 10 MG/1
10 LOZENGE ORAL 3 TIMES DAILY
Qty: 20 | Refills: 0 | Status: DISCONTINUED | COMMUNITY
Start: 2020-10-06 | End: 2021-10-19

## 2021-10-19 RX ORDER — FLUTICASONE PROPIONATE AND SALMETEROL 232; 14 UG/1; UG/1
232-14 POWDER, METERED RESPIRATORY (INHALATION)
Qty: 1 | Refills: 2 | Status: DISCONTINUED | COMMUNITY
Start: 2021-01-21 | End: 2021-10-19

## 2021-10-19 RX ORDER — MONTELUKAST 10 MG/1
10 TABLET, FILM COATED ORAL
Qty: 1 | Refills: 1 | Status: DISCONTINUED | COMMUNITY
Start: 2020-04-14 | End: 2021-10-19

## 2021-10-19 NOTE — HEALTH RISK ASSESSMENT
[Good] : ~his/her~  mood as  good [Yes] : Yes [2 - 3 times a week (3 pts)] : 2 - 3  times a week (3 points) [No] : In the past 12 months have you used drugs other than those required for medical reasons? No [No falls in past year] : Patient reported no falls in the past year [None] : None [With Family] : lives with family [] :  [Fully functional (bathing, dressing, toileting, transferring, walking, feeding)] : Fully functional (bathing, dressing, toileting, transferring, walking, feeding) [Feels Safe at Home] : Feels safe at home [Fully functional (using the telephone, shopping, preparing meals, housekeeping, doing laundry, using] : Fully functional and needs no help or supervision to perform IADLs (using the telephone, shopping, preparing meals, housekeeping, doing laundry, using transportation, managing medications and managing finances) [Smoke Detector] : smoke detector [Carbon Monoxide Detector] : carbon monoxide detector [Seat Belt] :  uses seat belt [] : No [de-identified] : active [de-identified] : regular [Reports changes in hearing] : Reports no changes in hearing [Reports changes in vision] : Reports no changes in vision [Reports changes in dental health] : Reports no changes in dental health

## 2021-10-19 NOTE — PHYSICAL EXAM
[No Acute Distress] : no acute distress [Well Nourished] : well nourished [Well Developed] : well developed [Well-Appearing] : well-appearing [Normal Sclera/Conjunctiva] : normal sclera/conjunctiva [PERRL] : pupils equal round and reactive to light [EOMI] : extraocular movements intact [Normal Outer Ear/Nose] : the outer ears and nose were normal in appearance [Normal Oropharynx] : the oropharynx was normal [Normal TMs] : both tympanic membranes were normal [Normal Nasal Mucosa] : the nasal mucosa was normal [No Lymphadenopathy] : no lymphadenopathy [Supple] : supple [Thyroid Normal, No Nodules] : the thyroid was normal and there were no nodules present [No Respiratory Distress] : no respiratory distress  [No Accessory Muscle Use] : no accessory muscle use [Clear to Auscultation] : lungs were clear to auscultation bilaterally [Normal Rate] : normal rate  [Regular Rhythm] : with a regular rhythm [Normal S1, S2] : normal S1 and S2 [No Murmur] : no murmur heard [No Edema] : there was no peripheral edema [Normal Appearance] : normal in appearance [No Masses] : no palpable masses [No Nipple Discharge] : no nipple discharge [No Axillary Lymphadenopathy] : no axillary lymphadenopathy [Soft] : abdomen soft [Non Tender] : non-tender [Non-distended] : non-distended [Normal Bowel Sounds] : normal bowel sounds [Normal Supraclavicular Nodes] : no supraclavicular lymphadenopathy [Normal Posterior Cervical Nodes] : no posterior cervical lymphadenopathy [Normal Anterior Cervical Nodes] : no anterior cervical lymphadenopathy [Coordination Grossly Intact] : coordination grossly intact [No Focal Deficits] : no focal deficits [Normal Gait] : normal gait [Deep Tendon Reflexes (DTR)] : deep tendon reflexes were 2+ and symmetric [Speech Grossly Normal] : speech grossly normal [Memory Grossly Normal] : memory grossly normal [Normal Affect] : the affect was normal [Alert and Oriented x3] : oriented to person, place, and time [Normal Mood] : the mood was normal [Normal Insight/Judgement] : insight and judgment were intact

## 2021-10-19 NOTE — ASSESSMENT
[FreeTextEntry1] : HCM\par Labs discussed with pt\par Gyn, mammogram, bone density advised\par Repeat colonoscopy advised\par Derm advised\par Pt will consider shingrix\par continue current meds\par

## 2021-10-19 NOTE — HISTORY OF PRESENT ILLNESS
[FreeTextEntry1] : Annual Physical [de-identified] : JOE GIL is a 50 yo woman with hypercholesterolemia, ELIDA here for a first physical.  She has been well overall.  Had covid 19 disease in 3/2020, was very ill with sats in 80% but did not go to hospital.  Recovered slowly at home.  Was out of work for 2 months.\par \par Gyn, mammogram, bone density, colonoscopy, derm advised.  Had prior colonoscopy with dr francia reynaga.  Tdap 2/21, flu, Pfizer utd.\par \par The patient is  with 2 children.  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  She would have no difficulty walking 4 to 6 blocks or 2 flights of stairs.

## 2021-12-02 ENCOUNTER — RESULT REVIEW (OUTPATIENT)
Age: 51
End: 2021-12-02

## 2021-12-02 ENCOUNTER — APPOINTMENT (OUTPATIENT)
Dept: ULTRASOUND IMAGING | Facility: IMAGING CENTER | Age: 51
End: 2021-12-02
Payer: COMMERCIAL

## 2021-12-02 ENCOUNTER — APPOINTMENT (OUTPATIENT)
Dept: RADIOLOGY | Facility: IMAGING CENTER | Age: 51
End: 2021-12-02
Payer: COMMERCIAL

## 2021-12-02 ENCOUNTER — APPOINTMENT (OUTPATIENT)
Dept: MAMMOGRAPHY | Facility: IMAGING CENTER | Age: 51
End: 2021-12-02
Payer: COMMERCIAL

## 2021-12-02 ENCOUNTER — OUTPATIENT (OUTPATIENT)
Dept: OUTPATIENT SERVICES | Facility: HOSPITAL | Age: 51
LOS: 1 days | End: 2021-12-02
Payer: COMMERCIAL

## 2021-12-02 DIAGNOSIS — K08.499 PARTIAL LOSS OF TEETH DUE TO OTHER SPECIFIED CAUSE, UNSPECIFIED CLASS: Chronic | ICD-10-CM

## 2021-12-02 DIAGNOSIS — Z98.890 OTHER SPECIFIED POSTPROCEDURAL STATES: Chronic | ICD-10-CM

## 2021-12-02 DIAGNOSIS — Z00.8 ENCOUNTER FOR OTHER GENERAL EXAMINATION: ICD-10-CM

## 2021-12-02 PROCEDURE — 76641 ULTRASOUND BREAST COMPLETE: CPT | Mod: 26,50

## 2021-12-02 PROCEDURE — 77063 BREAST TOMOSYNTHESIS BI: CPT

## 2021-12-02 PROCEDURE — 77063 BREAST TOMOSYNTHESIS BI: CPT | Mod: 26

## 2021-12-02 PROCEDURE — 76641 ULTRASOUND BREAST COMPLETE: CPT

## 2021-12-02 PROCEDURE — 77067 SCR MAMMO BI INCL CAD: CPT | Mod: 26

## 2021-12-02 PROCEDURE — 77080 DXA BONE DENSITY AXIAL: CPT | Mod: 26

## 2021-12-02 PROCEDURE — 77067 SCR MAMMO BI INCL CAD: CPT

## 2021-12-02 PROCEDURE — 77080 DXA BONE DENSITY AXIAL: CPT

## 2021-12-06 ENCOUNTER — APPOINTMENT (OUTPATIENT)
Dept: MAMMOGRAPHY | Facility: IMAGING CENTER | Age: 51
End: 2021-12-06
Payer: COMMERCIAL

## 2021-12-06 ENCOUNTER — OUTPATIENT (OUTPATIENT)
Dept: OUTPATIENT SERVICES | Facility: HOSPITAL | Age: 51
LOS: 1 days | End: 2021-12-06
Payer: COMMERCIAL

## 2021-12-06 ENCOUNTER — RESULT REVIEW (OUTPATIENT)
Age: 51
End: 2021-12-06

## 2021-12-06 DIAGNOSIS — Z98.890 OTHER SPECIFIED POSTPROCEDURAL STATES: Chronic | ICD-10-CM

## 2021-12-06 DIAGNOSIS — R92.8 OTHER ABNORMAL AND INCONCLUSIVE FINDINGS ON DIAGNOSTIC IMAGING OF BREAST: ICD-10-CM

## 2021-12-06 DIAGNOSIS — Z00.8 ENCOUNTER FOR OTHER GENERAL EXAMINATION: ICD-10-CM

## 2021-12-06 DIAGNOSIS — K08.499 PARTIAL LOSS OF TEETH DUE TO OTHER SPECIFIED CAUSE, UNSPECIFIED CLASS: Chronic | ICD-10-CM

## 2021-12-06 PROCEDURE — 77065 DX MAMMO INCL CAD UNI: CPT | Mod: 26,LT

## 2021-12-06 PROCEDURE — 77065 DX MAMMO INCL CAD UNI: CPT

## 2021-12-13 ENCOUNTER — OUTPATIENT (OUTPATIENT)
Dept: OUTPATIENT SERVICES | Facility: HOSPITAL | Age: 51
LOS: 1 days | End: 2021-12-13
Payer: COMMERCIAL

## 2021-12-13 ENCOUNTER — RESULT REVIEW (OUTPATIENT)
Age: 51
End: 2021-12-13

## 2021-12-13 ENCOUNTER — APPOINTMENT (OUTPATIENT)
Dept: MAMMOGRAPHY | Facility: IMAGING CENTER | Age: 51
End: 2021-12-13
Payer: COMMERCIAL

## 2021-12-13 DIAGNOSIS — Z00.8 ENCOUNTER FOR OTHER GENERAL EXAMINATION: ICD-10-CM

## 2021-12-13 DIAGNOSIS — Z98.890 OTHER SPECIFIED POSTPROCEDURAL STATES: Chronic | ICD-10-CM

## 2021-12-13 DIAGNOSIS — K08.499 PARTIAL LOSS OF TEETH DUE TO OTHER SPECIFIED CAUSE, UNSPECIFIED CLASS: Chronic | ICD-10-CM

## 2021-12-13 PROCEDURE — 19081 BX BREAST 1ST LESION STRTCTC: CPT

## 2021-12-13 PROCEDURE — 77065 DX MAMMO INCL CAD UNI: CPT

## 2021-12-13 PROCEDURE — 88305 TISSUE EXAM BY PATHOLOGIST: CPT

## 2021-12-13 PROCEDURE — 88305 TISSUE EXAM BY PATHOLOGIST: CPT | Mod: 26

## 2021-12-13 PROCEDURE — A4648: CPT

## 2021-12-13 PROCEDURE — 77065 DX MAMMO INCL CAD UNI: CPT | Mod: 26,LT

## 2021-12-13 PROCEDURE — 19081 BX BREAST 1ST LESION STRTCTC: CPT | Mod: LT

## 2021-12-27 ENCOUNTER — APPOINTMENT (OUTPATIENT)
Dept: SURGICAL ONCOLOGY | Facility: CLINIC | Age: 51
End: 2021-12-27
Payer: COMMERCIAL

## 2021-12-27 VITALS
DIASTOLIC BLOOD PRESSURE: 85 MMHG | BODY MASS INDEX: 30.62 KG/M2 | HEART RATE: 78 BPM | TEMPERATURE: 97.6 F | WEIGHT: 202 LBS | OXYGEN SATURATION: 95 % | HEIGHT: 68 IN | SYSTOLIC BLOOD PRESSURE: 118 MMHG

## 2021-12-27 DIAGNOSIS — D24.2 BENIGN NEOPLASM OF LEFT BREAST: ICD-10-CM

## 2021-12-27 PROCEDURE — 99204 OFFICE O/P NEW MOD 45 MIN: CPT

## 2021-12-27 NOTE — PHYSICAL EXAM
[Normal] : supple, no neck mass and thyroid not enlarged [Normal Neck Lymph Nodes] : normal neck lymph nodes  [Normal Supraclavicular Lymph Nodes] : normal supraclavicular lymph nodes [Normal Axillary Lymph Nodes] : normal axillary lymph nodes [Normal] : oriented to person, place and time, with appropriate affect [FreeTextEntry1] : INESSA present for exam  [de-identified] : Normal S1, S2. regular rate and rhythm. [de-identified] : Complete breast exam performed in supine and upright positions. No palpable masses, tenderness, nipple discharge, inversion, deviation or enlarged axillary or supraclavicular lymph nodes bilaterally.  [de-identified] : Clear breath sounds bilaterally, normal respiratory effort.

## 2021-12-27 NOTE — HISTORY OF PRESENT ILLNESS
[de-identified] : Ms. JOE GIL is a 51 year old female who present today for initial consultation for papilloma/ADH. Referred by: Dr. Quiros\par PMH: Asthma, and HLD\par Family History: paternal grandmother colon cancer and multiple aunt in paternal with breast cancer, maternal aunt breast cancer, \par \par Mammo/sono 12/2/21: Calcifications is the upper inner left breast. No evidence of malignancy in the right breast. BIRADS 0 \par \par Left breast mammo 12/6/21: Grouping of calcifications in the inner mid probably superior left breast  for which stereotactic biopsy is recommended. BIRADS 4\par \par Left breast upper inner calcification biopsy 12/14/21: \par - Fibrocystic changes with calcifications\par -Small intraductal papilloma with calcifications \par - Atypical duct hyperplasia \par

## 2021-12-27 NOTE — ASSESSMENT
[FreeTextEntry1] : IMP: 51 year old female present for papilloma of left breast \par \par PLAN: \par - B/L breast MRI \par -refer to plastic surgeon for possible reconstruction

## 2021-12-31 ENCOUNTER — OUTPATIENT (OUTPATIENT)
Dept: OUTPATIENT SERVICES | Facility: HOSPITAL | Age: 51
LOS: 1 days | End: 2021-12-31
Payer: COMMERCIAL

## 2021-12-31 ENCOUNTER — APPOINTMENT (OUTPATIENT)
Dept: MRI IMAGING | Facility: IMAGING CENTER | Age: 51
End: 2021-12-31
Payer: COMMERCIAL

## 2021-12-31 DIAGNOSIS — Z98.890 OTHER SPECIFIED POSTPROCEDURAL STATES: Chronic | ICD-10-CM

## 2021-12-31 DIAGNOSIS — D24.2 BENIGN NEOPLASM OF LEFT BREAST: ICD-10-CM

## 2021-12-31 DIAGNOSIS — K08.499 PARTIAL LOSS OF TEETH DUE TO OTHER SPECIFIED CAUSE, UNSPECIFIED CLASS: Chronic | ICD-10-CM

## 2021-12-31 PROCEDURE — 77049 MRI BREAST C-+ W/CAD BI: CPT | Mod: 26

## 2021-12-31 PROCEDURE — A9585: CPT

## 2021-12-31 PROCEDURE — C8937: CPT

## 2021-12-31 PROCEDURE — C8908: CPT

## 2022-01-31 ENCOUNTER — NON-APPOINTMENT (OUTPATIENT)
Age: 52
End: 2022-01-31

## 2022-01-31 ENCOUNTER — APPOINTMENT (OUTPATIENT)
Dept: PULMONOLOGY | Facility: CLINIC | Age: 52
End: 2022-01-31
Payer: COMMERCIAL

## 2022-01-31 VITALS
OXYGEN SATURATION: 98 % | WEIGHT: 200 LBS | HEART RATE: 77 BPM | RESPIRATION RATE: 17 BRPM | SYSTOLIC BLOOD PRESSURE: 120 MMHG | BODY MASS INDEX: 30.31 KG/M2 | DIASTOLIC BLOOD PRESSURE: 70 MMHG | HEIGHT: 68 IN | TEMPERATURE: 96.6 F

## 2022-01-31 DIAGNOSIS — J45.909 UNSPECIFIED ASTHMA, UNCOMPLICATED: ICD-10-CM

## 2022-01-31 PROCEDURE — 94010 BREATHING CAPACITY TEST: CPT

## 2022-01-31 PROCEDURE — 95012 NITRIC OXIDE EXP GAS DETER: CPT

## 2022-01-31 PROCEDURE — 99214 OFFICE O/P EST MOD 30 MIN: CPT | Mod: 25

## 2022-01-31 NOTE — PROCEDURE
[FreeTextEntry1] : FENO was 20 ; a normal value being less than 25\par Fractional exhaled nitric oxide (FENO) is regarded as a simple, noninvasive method for assessing eosinophilic airway inflammation. Produced by a variety of cells within the lung, nitric oxide (NO) concentrations are generally low in healthy individuals. However, high concentrations of NO appear to be involved in nonspecific host defense mechanisms and chronic inflammatory diseases such as asthma. The American Thoracic Society (ATS) therefore has recommended using FENO to aid in the diagnosis and monitoring of eosinophilic airway inflammation and asthma, and for identifying steroid responsive individuals whose chronic respiratory symptoms may be caused by airway inflammation. \par \par PFT revealed normal flows, with a FEV1 of 2.74L,which is 84% of predicted with a normal flow volume loop

## 2022-01-31 NOTE — HISTORY OF PRESENT ILLNESS
[TextBox_4] : Ms. GIL is a 51 year old female with hx of calcified tendinitis, acne, ADD, eczema, who presents to the office today for pulmonary follow up. Her chief complaint is \par \par -she notes skiing in [place] for 6 days \par -she notes energy level fluctuates\par -she notes unable to tolerate dental device due to teeth ache\par -she notes taking ibuprofen to tolerate dental medication but still unable to tolerate \par -she notes snoring worsened\par -she notes energy level michelle is 8/10\par -she notes sleeping 6 hrs but is interrupted\par -she denies muscle cramps and myalgia  \par -she notes use of Benadryl \par -she notes sleep is improved with Benadryl but sleep is still interrupted and wakes up foggy \par -She notes that bowels are regular \par -she notes weight loss and is currently stable but looking to lose more weight \par -She denies taking any new medications, vitamins, or supplements. \par -she notes decrease in energy around 3 pm and now naps at desk \par -she denies coughing but uses rescue inhaler PRN \par -she notes use of rescue inhaler before going outside when skiing \par -she denies sinus issues \par -she notes getting covid 19 booster \par -she notes exposure to Covid 19 but was not infected \par -she notes planning trip to [place] \par \par -denies any visual issues, headaches, nausea, vomiting, fever, chills, sweats, chest pain, chest pressure, diarrhea, constipation, dysphagia, dizziness, leg swelling, leg pain, itchy eyes, itchy ears, heartburn, reflux, or sour taste in the mouth.

## 2022-01-31 NOTE — ADDENDUM
[FreeTextEntry1] : Documented by Lupillo Lee acting as a scribe for Dr. Damion Poole on 01/31/2022 \par \par All medical record entries made by the Scribe were at my, Dr. Damion Poole's, direction and personally dictated by me on 01/31/2022 . I have reviewed the chart and agree that the record accurately reflects my personal performance of the history, physical exam, assessment and plan. I have also personally directed, reviewed, and agree with the discharge instructions

## 2022-01-31 NOTE — ASSESSMENT
[FreeTextEntry1] : Ms. GIL is a 51 year old female with a history of calcified tendinitis, acne, ADD, eczema, allergies, herniated disc  who presents to the office for pulmonary follow up, s/p COVID-19 positive / asthmatic bronchitis (3/2020) - still SOB (MAHER)/ Reflux present - Post covid symptoms - resolved- DX OSAS- now 20 lbs off- stable vax up to date \par \par The patient's shortness of breath is multifactorial due to:\par -pulmonary disease \par      - COVID-19 infection / pneumonitis \par     - asthmatic bronchitis \par -poor breathing mechanics \par -overweight/out of shape (now)\par -(doubt)cardiac disease\par \par Problem 1: s/p COVID-19 (+) / pneumonitis (not completely symptomatically resolved)-\par - S/p Covid 19 vaccine (Pfizer) x2 and JNJ x1 \par -Covid 19 vaccine discussed at length with patient; booster discussed\par - s/p Zithromax x2, and  Plaquenil \par - continue monitor temperature curve \par Immune Support Recommendations: \par -OTC Vitamin C 500mg BID \par -OTC Quercetin 250-500mg BID \par -OTC Zinc 75-100mg per day \par -OTC Melatonin 1or 2mg a night \par -OTC Vitamin D 1-4000mg per day \par -OTC Tonic Water 8oz per day\par \par Additional Supplements:\par -Liposomal glutathione\par -Berberine\par -SPM\par \par Problem 2: asthma (controlled) \par - continue Spiriva 2 qDay\par - continue Singulair 10 mg before bed\par -continue AirDuo 230 - 2 inhalations BID\par - continue Albuterol (0.83) by nebulizer QID  (gargle and rinse after use)\par -Asthma is believed to be caused by inherited (genetic) and environmental factor, but its exact cause is unknown. Asthma may be triggered by allergens, lung infections, or irritants in the air. Asthma triggers are different for each person\par - You have a clinical scenario most c/q acute bronchitis the etiology of which is unknown but empiric antibiotics are indicated. Hydration, mucolytics including mucinex, robitussin and the like are indicated. Cough controlling agents will be needed.\par \par Problem 3: Allergies / sinus\par - Continue Zyrtec / Xyzal \par -continue Flonase 1 sniff each nostril BID\par -get Blood work to include: asthma panel, food IgE panel, IgE level, eosinophil level, vitamin D level \par -Environmental measures for allergies were encouraged including mattress and pillow cover, air purifier, and environmental controls.\par \par Problem 4: GERD\par -continue Omeprazole 40 mg QAM pre- breakfast \par -continue Pepcid 40 mg QHS\par -Rule of 2s: avoid eating too much, eating too fast, eating too late, eating too spicy, eating too lousy, eating two hours before bed.\par -Things to avoid including overeating, spicy foods, tight clothing, eating within three hours of bed, this list is not all inclusive. \par -For treatment of reflux, possible options discussed including diet control, H2 blockers, PPIs, as well as coating motility agents discussed as treatment options. Timing of meals and proximity of last meal to sleep were discussed. If symptoms persist, a formal gastrointestinal evaluation is needed.\par \par Problem 5: Chronic Cough (resolved) \par - continue Tessalon Perles \par - Any cough greater than three weeks duration-differential diagnosis includes-asthma, upper airway cough syndrome, post nasal drip syndrome, gastroesophageal reflux, laryngopharyngeal reflux, cardiac disease (congestive heart failure, medicines, effects, etc), medication effects (b-blockers, ace inhibitors, ARBs, glaucoma meds, etc.), smoking, infectious, multifactorial, etc.\par \par Problem 6: Poor Mechanics of Breathing\par -recommended breathing techniques Jenni Bedolla \par - Proper breathing techniques were reviewed with an emphasis of exhalation. Patient instructed to breath in for 1 second and out for four seconds. Patient was encouraged to not talk while walking. \par \par Problem 7: Overweight / out of shape (now)- 20 lbs \par -Weight loss, exercise, and diet control were discussed and are highly encouraged. Treatment options were given such as, aqua therapy, and contacting a nutritionist. Recommended to use the elliptical, stationary bike, less use of treadmill. Mindful eating was explained to the patient Obesity is associated with worsening asthma, shortness of breath, and potential for cardiac disease, diabetes, and other underlying medical conditions. \par \par Problem 8: (+) mild ELIDA - DD unable due to teeth pain \par -s/p Home Sleep Study (12.2) \par -s/p Dr. Janice Aguilar evaluation for Oral Appliance Therapy - ? surgery, somnifix\par - Sleep apnea is associated with adverse clinical consequences which can affect most organ systems. Cardiovascular disease risk includes arrhythmias, atrial fibrillation, hypertension, coronary artery disease, and stroke. Metabolic disorders include diabetes type 2, non-alcoholic fatty liver disease. Mood disorder especially depression; and cognitive decline especially in the elderly. Associations with chronic reflux/Danielson’s esophagus some but not all inclusive. \par -Reasons include arousal consistent with hypopnea; respiratory events most prominent in REM sleep or supine position; therefore sleep staging and body position are important for accurate diagnosis and estimation of AHI. \par \par Problem 9: health maintenance\par -s/p influenza vaccine 2022 \par -recommended strep pneumonia vaccines after age 65: Prevnar-13 vaccine, followed by Pneumo vaccine 23 one year following\par -recommended early intervention for URIs\par -recommended regular osteoporosis evaluations\par -recommended early dermatological evaluations\par -recommended after the age of 50 to the age of 70, colonoscopy every 5 years \par \par  Follow up in 6 months \par -he  is recommended to call with any changes, questions, or concerns.\par

## 2022-01-31 NOTE — REASON FOR VISIT
[Follow-Up] : a follow-up visit [TextBox_44] : s/p COVID-19 positive, SOB, wheeze, chronic cough, GERD, RADS, ELIDA

## 2022-03-29 ENCOUNTER — APPOINTMENT (OUTPATIENT)
Dept: MAMMOGRAPHY | Facility: IMAGING CENTER | Age: 52
End: 2022-03-29

## 2022-03-29 ENCOUNTER — RESULT REVIEW (OUTPATIENT)
Age: 52
End: 2022-03-29

## 2022-03-29 ENCOUNTER — OUTPATIENT (OUTPATIENT)
Dept: OUTPATIENT SERVICES | Facility: HOSPITAL | Age: 52
LOS: 1 days | End: 2022-03-29
Payer: COMMERCIAL

## 2022-03-29 ENCOUNTER — OUTPATIENT (OUTPATIENT)
Dept: OUTPATIENT SERVICES | Facility: HOSPITAL | Age: 52
LOS: 1 days | End: 2022-03-29

## 2022-03-29 VITALS
HEART RATE: 86 BPM | OXYGEN SATURATION: 97 % | SYSTOLIC BLOOD PRESSURE: 116 MMHG | WEIGHT: 203.93 LBS | RESPIRATION RATE: 16 BRPM | HEIGHT: 69 IN | DIASTOLIC BLOOD PRESSURE: 80 MMHG | TEMPERATURE: 97 F

## 2022-03-29 DIAGNOSIS — Z98.890 OTHER SPECIFIED POSTPROCEDURAL STATES: Chronic | ICD-10-CM

## 2022-03-29 DIAGNOSIS — Z00.8 ENCOUNTER FOR OTHER GENERAL EXAMINATION: ICD-10-CM

## 2022-03-29 DIAGNOSIS — N63.20 UNSPECIFIED LUMP IN THE LEFT BREAST, UNSPECIFIED QUADRANT: ICD-10-CM

## 2022-03-29 DIAGNOSIS — K08.499 PARTIAL LOSS OF TEETH DUE TO OTHER SPECIFIED CAUSE, UNSPECIFIED CLASS: Chronic | ICD-10-CM

## 2022-03-29 DIAGNOSIS — D24.2 BENIGN NEOPLASM OF LEFT BREAST: ICD-10-CM

## 2022-03-29 LAB
ALBUMIN SERPL ELPH-MCNC: 4.6 G/DL — SIGNIFICANT CHANGE UP (ref 3.3–5)
ALP SERPL-CCNC: 79 U/L — SIGNIFICANT CHANGE UP (ref 40–120)
ALT FLD-CCNC: 16 U/L — SIGNIFICANT CHANGE UP (ref 4–33)
ANION GAP SERPL CALC-SCNC: 14 MMOL/L — SIGNIFICANT CHANGE UP (ref 7–14)
AST SERPL-CCNC: 15 U/L — SIGNIFICANT CHANGE UP (ref 4–32)
BILIRUB SERPL-MCNC: 0.3 MG/DL — SIGNIFICANT CHANGE UP (ref 0.2–1.2)
BUN SERPL-MCNC: 16 MG/DL — SIGNIFICANT CHANGE UP (ref 7–23)
CALCIUM SERPL-MCNC: 9.7 MG/DL — SIGNIFICANT CHANGE UP (ref 8.4–10.5)
CHLORIDE SERPL-SCNC: 103 MMOL/L — SIGNIFICANT CHANGE UP (ref 98–107)
CO2 SERPL-SCNC: 23 MMOL/L — SIGNIFICANT CHANGE UP (ref 22–31)
CREAT SERPL-MCNC: 0.76 MG/DL — SIGNIFICANT CHANGE UP (ref 0.5–1.3)
EGFR: 94 ML/MIN/1.73M2 — SIGNIFICANT CHANGE UP
GLUCOSE SERPL-MCNC: 106 MG/DL — HIGH (ref 70–99)
HCG SERPL-ACNC: <5 MIU/ML — SIGNIFICANT CHANGE UP
HCT VFR BLD CALC: 43.5 % — SIGNIFICANT CHANGE UP (ref 34.5–45)
HGB BLD-MCNC: 14.8 G/DL — SIGNIFICANT CHANGE UP (ref 11.5–15.5)
MCHC RBC-ENTMCNC: 32.4 PG — SIGNIFICANT CHANGE UP (ref 27–34)
MCHC RBC-ENTMCNC: 34 GM/DL — SIGNIFICANT CHANGE UP (ref 32–36)
MCV RBC AUTO: 95.2 FL — SIGNIFICANT CHANGE UP (ref 80–100)
NRBC # BLD: 0 /100 WBCS — SIGNIFICANT CHANGE UP
NRBC # FLD: 0 K/UL — SIGNIFICANT CHANGE UP
PLATELET # BLD AUTO: 350 K/UL — SIGNIFICANT CHANGE UP (ref 150–400)
POTASSIUM SERPL-MCNC: 3.9 MMOL/L — SIGNIFICANT CHANGE UP (ref 3.5–5.3)
POTASSIUM SERPL-SCNC: 3.9 MMOL/L — SIGNIFICANT CHANGE UP (ref 3.5–5.3)
PROT SERPL-MCNC: 7.4 G/DL — SIGNIFICANT CHANGE UP (ref 6–8.3)
RBC # BLD: 4.57 M/UL — SIGNIFICANT CHANGE UP (ref 3.8–5.2)
RBC # FLD: 12 % — SIGNIFICANT CHANGE UP (ref 10.3–14.5)
SODIUM SERPL-SCNC: 140 MMOL/L — SIGNIFICANT CHANGE UP (ref 135–145)
WBC # BLD: 5.22 K/UL — SIGNIFICANT CHANGE UP (ref 3.8–10.5)
WBC # FLD AUTO: 5.22 K/UL — SIGNIFICANT CHANGE UP (ref 3.8–10.5)

## 2022-03-29 PROCEDURE — C1739: CPT

## 2022-03-29 PROCEDURE — 19281 PERQ DEVICE BREAST 1ST IMAG: CPT | Mod: LT

## 2022-03-29 PROCEDURE — 19281 PERQ DEVICE BREAST 1ST IMAG: CPT

## 2022-03-29 RX ORDER — ATORVASTATIN CALCIUM 80 MG/1
1 TABLET, FILM COATED ORAL
Qty: 0 | Refills: 0 | DISCHARGE

## 2022-03-29 RX ORDER — MULTIVIT-MIN/FERROUS GLUCONATE 9 MG/15 ML
1 LIQUID (ML) ORAL
Qty: 0 | Refills: 0 | DISCHARGE

## 2022-03-29 NOTE — H&P PST ADULT - NSALCOHOLAMT_GEN_A_CORE_SD
[Negative] : Allergy/Immunology [FreeTextEntry1] : +constipation, +emesis, +g tube dependent 1-2 drinks

## 2022-03-29 NOTE — H&P PST ADULT - NSICDXPASTMEDICALHX_GEN_ALL_CORE_FT
PAST MEDICAL HISTORY:  ADD (attention deficit disorder)     Anxiety     Back injury muscle spasm    Hyperlipidemia     Carrillo neuroma, left foot pain    Right shoulder pain     Seasonal allergies      PAST MEDICAL HISTORY:  ADD (attention deficit disorder)     Anxiety     Asthma last rescue inhaler use-"last week"    Back injury muscle spasm    Hyperlipidemia     Carrillo neuroma, left foot pain    Obese     Seasonal allergies

## 2022-03-29 NOTE — H&P PST ADULT - PROBLEM SELECTOR PLAN 1
Pt. is scheduled for a left breast lumpectomy...4/11/22.  Pt. verbalized understanding of instructions and that Chlorhexidine is for external use.

## 2022-03-29 NOTE — H&P PST ADULT - NSICDXPASTSURGICALHX_GEN_ALL_CORE_FT
PAST SURGICAL HISTORY:  H/O foot surgery 2017, Left 3rd space IM ligament release for neuroma    H/O left breast biopsy     H/O sinus surgery septoplasty/ turbinectomy 2008    [place] teeth extracted 1987     PAST SURGICAL HISTORY:  H/O foot surgery 2017, Left 3rd space IM ligament release for neuroma    H/O left breast biopsy     H/O repair of right rotator cuff 2018    H/O sinus surgery septoplasty/ turbinectomy 2009    S/P excision of Carrillo's neuroma left foot-2016    [place] teeth extracted 1987

## 2022-03-29 NOTE — H&P PST ADULT - HISTORY OF PRESENT ILLNESS
Pt. is a 51 yo female  Pt. is a 51 yo female that had a routine mammogram that revealed a left breast mass.

## 2022-04-08 VITALS
WEIGHT: 204.59 LBS | SYSTOLIC BLOOD PRESSURE: 126 MMHG | RESPIRATION RATE: 18 BRPM | OXYGEN SATURATION: 97 % | TEMPERATURE: 98 F | HEART RATE: 88 BPM | DIASTOLIC BLOOD PRESSURE: 86 MMHG | HEIGHT: 69 IN

## 2022-04-08 NOTE — ASU PREOPERATIVE ASSESSMENT, ADULT ([facility] ONLY) - FALL HARM RISK - HARM RISK INTERVENTIONS

## 2022-04-08 NOTE — ASU PREOPERATIVE ASSESSMENT, ADULT ([facility] ONLY) - TEACHING/LEARNING LEARNING PREFERENCES
----- Message from Agueda Carrington CNM sent at 9/16/2020  6:34 AM CDT -----  Please notify of normal ovaries and IUD correctly placed. No obvious cause of pelvic pain.   ----- Message -----  From: Fran Ocampo Results In  Sent: 9/16/2020   6:13 AM CDT  To: [facility] Result Pool      
I informed Denisha of normal ovaries and IUD correctly placed. No obvious cause of pelvic pain. She offers no questions.  
Left a voice message  
Patient returned your call.    
skill demonstration/verbal instruction/written material

## 2022-04-10 ENCOUNTER — TRANSCRIPTION ENCOUNTER (OUTPATIENT)
Age: 52
End: 2022-04-10

## 2022-04-11 ENCOUNTER — TRANSCRIPTION ENCOUNTER (OUTPATIENT)
Age: 52
End: 2022-04-11

## 2022-04-11 ENCOUNTER — RESULT REVIEW (OUTPATIENT)
Age: 52
End: 2022-04-11

## 2022-04-11 ENCOUNTER — APPOINTMENT (OUTPATIENT)
Dept: SURGICAL ONCOLOGY | Facility: AMBULATORY SURGERY CENTER | Age: 52
End: 2022-04-11

## 2022-04-11 ENCOUNTER — OUTPATIENT (OUTPATIENT)
Dept: OUTPATIENT SERVICES | Facility: HOSPITAL | Age: 52
LOS: 1 days | End: 2022-04-11
Payer: COMMERCIAL

## 2022-04-11 ENCOUNTER — OUTPATIENT (OUTPATIENT)
Dept: OUTPATIENT SERVICES | Facility: HOSPITAL | Age: 52
LOS: 1 days | Discharge: HOME | End: 2022-04-11
Payer: COMMERCIAL

## 2022-04-11 ENCOUNTER — APPOINTMENT (OUTPATIENT)
Dept: MAMMOGRAPHY | Facility: IMAGING CENTER | Age: 52
End: 2022-04-11
Payer: COMMERCIAL

## 2022-04-11 VITALS
RESPIRATION RATE: 18 BRPM | HEART RATE: 85 BPM | SYSTOLIC BLOOD PRESSURE: 116 MMHG | DIASTOLIC BLOOD PRESSURE: 70 MMHG | OXYGEN SATURATION: 99 %

## 2022-04-11 DIAGNOSIS — Z98.890 OTHER SPECIFIED POSTPROCEDURAL STATES: Chronic | ICD-10-CM

## 2022-04-11 DIAGNOSIS — K08.499 PARTIAL LOSS OF TEETH DUE TO OTHER SPECIFIED CAUSE, UNSPECIFIED CLASS: Chronic | ICD-10-CM

## 2022-04-11 DIAGNOSIS — Z00.8 ENCOUNTER FOR OTHER GENERAL EXAMINATION: ICD-10-CM

## 2022-04-11 DIAGNOSIS — D24.2 BENIGN NEOPLASM OF LEFT BREAST: ICD-10-CM

## 2022-04-11 PROBLEM — E66.9 OBESITY, UNSPECIFIED: Chronic | Status: ACTIVE | Noted: 2022-03-29

## 2022-04-11 PROCEDURE — 19301 PARTIAL MASTECTOMY: CPT | Mod: LT

## 2022-04-11 PROCEDURE — 76098 X-RAY EXAM SURGICAL SPECIMEN: CPT

## 2022-04-11 PROCEDURE — 88307 TISSUE EXAM BY PATHOLOGIST: CPT | Mod: 26

## 2022-04-11 PROCEDURE — 76098 X-RAY EXAM SURGICAL SPECIMEN: CPT | Mod: 26

## 2022-04-11 RX ORDER — OXYCODONE HYDROCHLORIDE 5 MG/1
2 TABLET ORAL
Qty: 24 | Refills: 0
Start: 2022-04-11 | End: 2022-04-12

## 2022-04-11 RX ORDER — SODIUM CHLORIDE 9 MG/ML
1000 INJECTION, SOLUTION INTRAVENOUS
Refills: 0 | Status: DISCONTINUED | OUTPATIENT
Start: 2022-04-11 | End: 2022-04-25

## 2022-04-11 NOTE — ASU DISCHARGE PLAN (ADULT/PEDIATRIC) - CARE PROVIDER_API CALL
Farooq Bui)  Surgery  [address] [place] [id number]  Phone: [phone]  Fax: [phone]  Established Patient  Follow Up Time: 2 weeks

## 2022-04-11 NOTE — ASU DISCHARGE PLAN (ADULT/PEDIATRIC) - ASU DC SPECIAL INSTRUCTIONSFT
[facility]  CANCER  I  N  S  T  I  T  U  T E     Department of Surgery  Division of Surgical Oncology    Alfredito Plata M.D., EUGENIO.TIARA.  Chief, Division of Surgical Oncology    Wyatt Mcnair M.D., F.A.C.S., F.A.S.ROSA.  Associate , Department of Surgery    Chris Hinton M.D., F.A.C.S.  Bulmaro Vides M.D., F.A.C.S.  Benigno Riojas M.D., F.A.C.S.  Clifford Roper M.D., F.A.C.S.  Bulmaro Ramos M.D., F.A.C.S.  Farooq Bui M.D., F.AASHISHC.S.      Breast Biopsy/Lumpectomy Post-operative Instructions  1.	Supportive bra- Please bring a sports/athletic bra with you on the day of your surgery.  You will wear it home from the hospital.  You should wear the supportive bra continuously for 48 hours after the procedure, including wearing it to sleep.  Therefore, you may wear your regular bra.  You may remove the bra to shower.  The sports bra will provide support, decrease the amount of swelling at the biopsy site, and make your recovery more comfortable.    2.	Wound dressing- There is a dressing on the wound, which consists of 2 layers.  The outer layer is a clear plastic dressing (called Tegaderm) which is waterproof.  This should remain in place for 2 days post-operatively.  On the 2nd post-operative day, you should remove the clear plastic Tegaderm dressing.  Underneath the Tegaderm dressing, there are white surgical tapes (called steri strips) which are directly adherent to the skin.  These should remain on the skin, and will peel off naturally.  All of the stitches are “internal” and will dissolve naturally.    3.	Showering/Bathing- You may shower over the dressing the very next day after surgery.  Allow the water to run over the dressing, but do not scrub the area.  It is best not to sit in a bathtub or swimming pool for at least 1-2 weeks after surgery.    4.	Activity level- You may resume most normal daily activity as tolerated, but avoid strenuous activities such as aerobics, jogging, exercising or heavy lifting for at least 1 week after surgery.  You may return to work in 1-2 days after surgery.  You may drive as long as you are not taking any prescription pain medication.    5.	Pain Medication- You may take the prescribed medication, or you may take extra-strength Tylenol as needed.  Please don to take aspirin, Motrin, Advil or any other anti-inflammatory medications, as these medications may cause bleeding or bruising.    6.	Follow-up Appointment- Please call the office to schedule your post-operative appointment which should be approximately 10 days after your surgery. Office [phone]    7.	Bruising/Bleeding/Swelling- It is normal for there to be some bruising and swelling at the breast biopsy site, and there may be some staining of blood on to the dressing.  Some discomfort at the surgical site is expected.  If your symptoms seem excessive, or if you have any question or concerns, please call the office.      Anesthesia Precautions:  For the next 12 hours do not:   •	drive a car,  •	drink alcohol, beer, or wine,   •	make important personal or business decisions  Diet:   •	Progress diet slowly unless otherwise indicated    Physician Notification  •	Any Prescription issues  •	Bleeding that does not stop  •	Persistent nausea and vomiting  •	Pain not relieved by medications  •	Fever greater than 101®F  •	Inability to tolerate liquids or foods  •	Unable to urinate after 8 hours  Discharge and Disposition  •	Discharge to home/ group home/assisted living  •	Accompanied by Family/Spouse/ Parents/ Significant Other/ Friend/ and or Caregiver    Follow Up Care:  •	In the event that you develop a complication and you are unable to reach your own physician, you may contact:  [phone] or go to the nearest Emergency Room.   •	Please call your surgeon to schedule your follow up appointment [phone]

## 2022-04-11 NOTE — ASU DISCHARGE PLAN (ADULT/PEDIATRIC) - NS MD DC FALL RISK RISK
For information on Fall & Injury Prevention, visit: [email or web address] OR  [email or web address] OR  https://www.cdc.gov/steadi/patient.html

## 2022-04-11 NOTE — BRIEF OPERATIVE NOTE - OPERATION/FINDINGS
Left breast lumpectomy. Curvilinear incision made along the medial aspect of the left breast. Breast mass was identified via Magseed. No margins. Minimal EBL, no drains. Primary closure.

## 2022-04-11 NOTE — BRIEF OPERATIVE NOTE - NSICDXBRIEFPOSTOP_GEN_ALL_CORE_FT
POST-OP DIAGNOSIS:  Benign neoplasm of left breast 11-Apr-2022 11:02:05  Andrea Zheng   negative Regular rate and variability/No murmur/Normal S1, S2

## 2022-04-14 LAB — SURGICAL PATHOLOGY STUDY: SIGNIFICANT CHANGE UP

## 2022-04-19 ENCOUNTER — APPOINTMENT (OUTPATIENT)
Dept: SURGICAL ONCOLOGY | Facility: CLINIC | Age: 52
End: 2022-04-19
Payer: COMMERCIAL

## 2022-04-19 VITALS
SYSTOLIC BLOOD PRESSURE: 114 MMHG | OXYGEN SATURATION: 98 % | TEMPERATURE: 97.6 F | HEART RATE: 81 BPM | DIASTOLIC BLOOD PRESSURE: 77 MMHG | RESPIRATION RATE: 17 BRPM

## 2022-04-19 PROCEDURE — 99024 POSTOP FOLLOW-UP VISIT: CPT

## 2022-05-02 NOTE — REASON FOR VISIT
[de-identified] : Status post left breast lumpectomy [de-identified] : April 11 [de-identified] : 8 [de-identified] : Patient immediately developed hematoma which is now improving.  Less firm less painful [Post-Op] : a post-op for [FreeTextEntry2] : Follow-up left breast hematoma

## 2022-05-02 NOTE — HISTORY OF PRESENT ILLNESS
[de-identified] : Patient states the hematoma is improving however still gives her pain when active.

## 2022-05-02 NOTE — REASON FOR VISIT
[de-identified] : Status post left breast lumpectomy [de-identified] : April 11 [de-identified] : 8 [de-identified] : Patient immediately developed hematoma which is now improving.  Less firm less painful [Post-Op] : a post-op for [FreeTextEntry2] : Follow-up left breast hematoma

## 2022-05-02 NOTE — ASSESSMENT
[FreeTextEntry1] : Hematoma/seroma aspirated without complication.  Patient tolerated the procedure well.\par States she feels much better.  We will repeat the procedure next week if needed.

## 2022-05-02 NOTE — HISTORY OF PRESENT ILLNESS
[de-identified] : Patient states the hematoma is improving however still gives her pain when active.

## 2022-05-09 ENCOUNTER — RX RENEWAL (OUTPATIENT)
Age: 52
End: 2022-05-09

## 2022-05-09 ENCOUNTER — APPOINTMENT (OUTPATIENT)
Dept: SURGICAL ONCOLOGY | Facility: CLINIC | Age: 52
End: 2022-05-09
Payer: COMMERCIAL

## 2022-05-09 PROCEDURE — 99024 POSTOP FOLLOW-UP VISIT: CPT

## 2022-05-11 ENCOUNTER — APPOINTMENT (OUTPATIENT)
Dept: SURGICAL ONCOLOGY | Facility: CLINIC | Age: 52
End: 2022-05-11
Payer: COMMERCIAL

## 2022-05-11 PROCEDURE — 99024 POSTOP FOLLOW-UP VISIT: CPT

## 2022-05-11 NOTE — HISTORY OF PRESENT ILLNESS
[de-identified] : Pt. is s/p left lumpectomy on 4/11/2022. Path revealed benign pathology (benign breast tissue showing stromal fibrosis, adenosis and columnar cell changes).  Pt. is here with recurrent hematoma.  States her breast is very swollen and feels heavy.

## 2022-05-11 NOTE — PHYSICAL EXAM
[Normal] : well developed, well nourished, in no acute distress [de-identified] : healed left breast incision;  left breast swelling/hematoma; resolving ecchymoses ; KIP drain placed

## 2022-05-11 NOTE — ASSESSMENT
[FreeTextEntry1] : Hematoma/seroma aspirated  (~160 cc) and KIP drain placed. \par KIP teaching provided\par Pt. will return on Monday

## 2022-05-20 ENCOUNTER — APPOINTMENT (OUTPATIENT)
Dept: SURGICAL ONCOLOGY | Facility: CLINIC | Age: 52
End: 2022-05-20
Payer: COMMERCIAL

## 2022-05-20 PROCEDURE — 99024 POSTOP FOLLOW-UP VISIT: CPT

## 2022-05-23 ENCOUNTER — LABORATORY RESULT (OUTPATIENT)
Age: 52
End: 2022-05-23

## 2022-05-23 ENCOUNTER — APPOINTMENT (OUTPATIENT)
Dept: SURGICAL ONCOLOGY | Facility: CLINIC | Age: 52
End: 2022-05-23
Payer: COMMERCIAL

## 2022-05-23 PROCEDURE — 99024 POSTOP FOLLOW-UP VISIT: CPT

## 2022-05-23 NOTE — HISTORY OF PRESENT ILLNESS
[de-identified] : The patient presented yesterday Sunday with a small dehiscence at incision with some old clot exuding from the area.  I asked her to return today for further evacuation of any hematoma, wound irrigation and subsequent packing.  Approximately 10 cc of 1% lidocaine with epinephrine was used prior to the packing.  While this is going to improve over the next few days I am tentatively going to schedule the patient for a wound exploration for this week..

## 2022-05-24 ENCOUNTER — OUTPATIENT (OUTPATIENT)
Dept: OUTPATIENT SERVICES | Facility: HOSPITAL | Age: 52
LOS: 1 days | End: 2022-05-24

## 2022-05-24 ENCOUNTER — TRANSCRIPTION ENCOUNTER (OUTPATIENT)
Age: 52
End: 2022-05-24

## 2022-05-24 VITALS
TEMPERATURE: 98 F | HEIGHT: 69 IN | SYSTOLIC BLOOD PRESSURE: 113 MMHG | HEART RATE: 86 BPM | OXYGEN SATURATION: 98 % | RESPIRATION RATE: 16 BRPM | WEIGHT: 209 LBS | DIASTOLIC BLOOD PRESSURE: 64 MMHG

## 2022-05-24 VITALS
OXYGEN SATURATION: 97 % | HEIGHT: 69 IN | WEIGHT: 209 LBS | SYSTOLIC BLOOD PRESSURE: 113 MMHG | RESPIRATION RATE: 16 BRPM | TEMPERATURE: 98 F | DIASTOLIC BLOOD PRESSURE: 81 MMHG | HEART RATE: 84 BPM

## 2022-05-24 DIAGNOSIS — Z98.890 OTHER SPECIFIED POSTPROCEDURAL STATES: Chronic | ICD-10-CM

## 2022-05-24 DIAGNOSIS — K08.499 PARTIAL LOSS OF TEETH DUE TO OTHER SPECIFIED CAUSE, UNSPECIFIED CLASS: Chronic | ICD-10-CM

## 2022-05-24 DIAGNOSIS — N64.89 OTHER SPECIFIED DISORDERS OF BREAST: ICD-10-CM

## 2022-05-24 DIAGNOSIS — J45.909 UNSPECIFIED ASTHMA, UNCOMPLICATED: ICD-10-CM

## 2022-05-24 DIAGNOSIS — K21.9 GASTRO-ESOPHAGEAL REFLUX DISEASE WITHOUT ESOPHAGITIS: ICD-10-CM

## 2022-05-24 DIAGNOSIS — G47.30 SLEEP APNEA, UNSPECIFIED: ICD-10-CM

## 2022-05-24 LAB
ANION GAP SERPL CALC-SCNC: 15 MMOL/L — HIGH (ref 7–14)
BUN SERPL-MCNC: 10 MG/DL — SIGNIFICANT CHANGE UP (ref 7–23)
CALCIUM SERPL-MCNC: 10.3 MG/DL — SIGNIFICANT CHANGE UP (ref 8.4–10.5)
CHLORIDE SERPL-SCNC: 100 MMOL/L — SIGNIFICANT CHANGE UP (ref 98–107)
CO2 SERPL-SCNC: 23 MMOL/L — SIGNIFICANT CHANGE UP (ref 22–31)
CREAT SERPL-MCNC: 0.85 MG/DL — SIGNIFICANT CHANGE UP (ref 0.5–1.3)
EGFR: 82 ML/MIN/1.73M2 — SIGNIFICANT CHANGE UP
GLUCOSE SERPL-MCNC: 106 MG/DL — HIGH (ref 70–99)
HCG UR QL: NEGATIVE — SIGNIFICANT CHANGE UP
HCT VFR BLD CALC: 43.9 % — SIGNIFICANT CHANGE UP (ref 34.5–45)
HGB BLD-MCNC: 14.2 G/DL — SIGNIFICANT CHANGE UP (ref 11.5–15.5)
MCHC RBC-ENTMCNC: 32.3 GM/DL — SIGNIFICANT CHANGE UP (ref 32–36)
MCHC RBC-ENTMCNC: 32.3 PG — SIGNIFICANT CHANGE UP (ref 27–34)
MCV RBC AUTO: 99.8 FL — SIGNIFICANT CHANGE UP (ref 80–100)
NRBC # BLD: 0 /100 WBCS — SIGNIFICANT CHANGE UP
NRBC # FLD: 0 K/UL — SIGNIFICANT CHANGE UP
PLATELET # BLD AUTO: 400 K/UL — SIGNIFICANT CHANGE UP (ref 150–400)
POTASSIUM SERPL-MCNC: 3.9 MMOL/L — SIGNIFICANT CHANGE UP (ref 3.5–5.3)
POTASSIUM SERPL-SCNC: 3.9 MMOL/L — SIGNIFICANT CHANGE UP (ref 3.5–5.3)
RBC # BLD: 4.4 M/UL — SIGNIFICANT CHANGE UP (ref 3.8–5.2)
RBC # FLD: 11.7 % — SIGNIFICANT CHANGE UP (ref 10.3–14.5)
SODIUM SERPL-SCNC: 138 MMOL/L — SIGNIFICANT CHANGE UP (ref 135–145)
WBC # BLD: 6.84 K/UL — SIGNIFICANT CHANGE UP (ref 3.8–10.5)
WBC # FLD AUTO: 6.84 K/UL — SIGNIFICANT CHANGE UP (ref 3.8–10.5)

## 2022-05-24 RX ORDER — MULTIVIT-MIN/FERROUS GLUCONATE 9 MG/15 ML
1 LIQUID (ML) ORAL
Qty: 0 | Refills: 0 | DISCHARGE

## 2022-05-24 RX ORDER — TIOTROPIUM BROMIDE 18 UG/1
2 CAPSULE ORAL; RESPIRATORY (INHALATION)
Qty: 0 | Refills: 0 | DISCHARGE

## 2022-05-24 RX ORDER — CHOLECALCIFEROL (VITAMIN D3) 125 MCG
1 CAPSULE ORAL
Qty: 0 | Refills: 0 | DISCHARGE

## 2022-05-24 NOTE — H&P PST ADULT - PROBLEM SELECTOR PLAN 1
Evacuation of left breast Hematoma      CBC CMP UCG    Pre-op instructions  given an explained. Evacuation of left breast Hematoma      CBC BMP UCG    Pre-op instructions  given an explained.

## 2022-05-24 NOTE — H&P PST ADULT - HISTORY OF PRESENT ILLNESS
Pt. is a 53 yo female that had a routine mammogram that revealed a left breast mass. Pt reports she developed hematoma following surgery which has been drained several times; blood collection reoccurred  Pt reports most recently suture line reopened, now with packing.  scheduled for Evacuation of left breast Hematoma   Pt. is a 53 yo female that had a routine mammogram that revealed a left breast mass. Pt reports she developed hematoma following surgery which has been drained several times with reoccurrence of blood collection.  Pt reports  suture line recently reopened, now with packing.  Scheduled for Evacuation of left breast Hematoma .

## 2022-05-24 NOTE — H&P PST ADULT - MALLAMPATI CLASS
I/II on phonation I/II on phonation/Class II - visualization of the soft palate, fauces, and uvula on phonation/Class II - visualization of the soft palate, fauces, and uvula

## 2022-05-24 NOTE — H&P PST ADULT - RX
periodic use of "mandibular advancement device" Home pt had "mandibular advancement device".  Per pt, she lost 27 lbs in past 4 months and symptoms resolved. No repeat Sleep study done Pt had "mandibular advancement device".  Per pt, she lost 27 lbs in past 4 months,  longer symptomatic, . No repeat Sleep study done

## 2022-05-24 NOTE — H&P PST ADULT - ASSESSMENT
Pt. is a 51 yo female that had a routine mammogram that revealed a left breast mass. Pt reports she developed hematoma following surgery which has been drained several times; blood collection reoccurred  Pt reports most recently suture line reopened, now with packing.  scheduled for Evacuation of left breast Hematoma

## 2022-05-24 NOTE — H&P PST ADULT - RESPIRATORY AND THORAX COMMENTS
Hx of asthma, well controlled on present medication regimen.  Uses rescue inhaler approx 2x/wk when exercising or during seasonal allergy season

## 2022-05-24 NOTE — H&P PST ADULT - NSICDXPASTMEDICALHX_GEN_ALL_CORE_FT
PAST MEDICAL HISTORY:  ADD (attention deficit disorder)     Anxiety     Asthma last rescue inhaler use-"last week"    Back injury muscle spasm    Hyperlipidemia     Carrillo neuroma, left foot pain    Obese     Seasonal allergies      PAST MEDICAL HISTORY:  ADD (attention deficit disorder)     Anxiety     Asthma last rescue inhaler les than 1x/wk.    Back injury muscle spasm    Hematoma of left breast     Hyperlipidemia       Carrillo neuroma, left foot pain    Obese     Seasonal allergies      PAST MEDICAL HISTORY:  ADD (attention deficit disorder)     Anxiety     Asthma last rescue inhaler les than 1x/wk.    Back injury muscle spasm    Hematoma of left breast     Hyperlipidemia       Left breast mass benign 2022    Carrillo neuroma, left foot pain    Obese     Seasonal allergies

## 2022-05-24 NOTE — H&P PST ADULT - NSICDXPASTSURGICALHX_GEN_ALL_CORE_FT
PAST SURGICAL HISTORY:  H/O foot surgery 2017, Left 3rd space IM ligament release for neuroma    H/O left breast biopsy     H/O repair of right rotator cuff 2018    H/O sinus surgery septoplasty/ turbinectomy 2009    S/P excision of Carrillo's neuroma left foot-2016    [place] teeth extracted 1987     PAST SURGICAL HISTORY:  H/O foot surgery 2017, Left 3rd space IM ligament release for neuroma    H/O left breast biopsy     H/O lumpectomy (L) 4/2022    H/O repair of right rotator cuff 2018    H/O sinus surgery septoplasty/ turbinectomy 2009    S/P excision of Carrillo's neuroma left foot-2016    [place] teeth extracted 1987

## 2022-05-25 ENCOUNTER — TRANSCRIPTION ENCOUNTER (OUTPATIENT)
Age: 52
End: 2022-05-25

## 2022-05-25 ENCOUNTER — RESULT REVIEW (OUTPATIENT)
Age: 52
End: 2022-05-25

## 2022-05-25 ENCOUNTER — OUTPATIENT (OUTPATIENT)
Dept: OUTPATIENT SERVICES | Facility: HOSPITAL | Age: 52
LOS: 1 days | Discharge: HOME | End: 2022-05-25
Payer: COMMERCIAL

## 2022-05-25 ENCOUNTER — APPOINTMENT (OUTPATIENT)
Dept: SURGICAL ONCOLOGY | Facility: AMBULATORY SURGERY CENTER | Age: 52
End: 2022-05-25

## 2022-05-25 VITALS
RESPIRATION RATE: 18 BRPM | TEMPERATURE: 98 F | DIASTOLIC BLOOD PRESSURE: 73 MMHG | HEART RATE: 79 BPM | OXYGEN SATURATION: 100 % | SYSTOLIC BLOOD PRESSURE: 114 MMHG

## 2022-05-25 DIAGNOSIS — N64.89 OTHER SPECIFIED DISORDERS OF BREAST: ICD-10-CM

## 2022-05-25 DIAGNOSIS — Z98.890 OTHER SPECIFIED POSTPROCEDURAL STATES: Chronic | ICD-10-CM

## 2022-05-25 DIAGNOSIS — K08.499 PARTIAL LOSS OF TEETH DUE TO OTHER SPECIFIED CAUSE, UNSPECIFIED CLASS: Chronic | ICD-10-CM

## 2022-05-25 PROCEDURE — 88304 TISSUE EXAM BY PATHOLOGIST: CPT | Mod: 26

## 2022-05-25 PROCEDURE — 19100 BX BREAST PERCUT W/O IMAGE: CPT | Mod: LT,78

## 2022-05-25 DEVICE — ARISTA 3GR: Type: IMPLANTABLE DEVICE | Site: LEFT | Status: FUNCTIONAL

## 2022-05-25 DEVICE — SURGICEL FIBRILLAR 2 X 4": Type: IMPLANTABLE DEVICE | Site: LEFT | Status: FUNCTIONAL

## 2022-05-25 RX ORDER — CIPROFLOXACIN LACTATE 400MG/40ML
1 VIAL (ML) INTRAVENOUS
Qty: 0 | Refills: 0 | DISCHARGE

## 2022-05-25 RX ORDER — MONTELUKAST 4 MG/1
1 TABLET, CHEWABLE ORAL
Qty: 0 | Refills: 0 | DISCHARGE

## 2022-05-25 RX ORDER — OMEPRAZOLE 10 MG/1
1 CAPSULE, DELAYED RELEASE ORAL
Qty: 0 | Refills: 0 | DISCHARGE

## 2022-05-25 RX ORDER — ACETAMINOPHEN 500 MG
3 TABLET ORAL
Qty: 0 | Refills: 0 | DISCHARGE

## 2022-05-25 RX ORDER — CETIRIZINE HYDROCHLORIDE 10 MG/1
1 TABLET ORAL
Qty: 0 | Refills: 0 | DISCHARGE

## 2022-05-25 RX ORDER — ALBUTEROL 90 UG/1
2 AEROSOL, METERED ORAL
Qty: 0 | Refills: 0 | DISCHARGE

## 2022-05-25 RX ORDER — FLUTICASONE PROPIONATE AND SALMETEROL 50; 250 UG/1; UG/1
2 POWDER ORAL; RESPIRATORY (INHALATION)
Qty: 0 | Refills: 0 | DISCHARGE

## 2022-05-25 RX ORDER — ATORVASTATIN CALCIUM 80 MG/1
1 TABLET, FILM COATED ORAL
Qty: 0 | Refills: 0 | DISCHARGE

## 2022-05-25 NOTE — ASU DISCHARGE PLAN (ADULT/PEDIATRIC) - NURSING INSTRUCTIONS
You received IV Tylenol for pain management at 6am. Please DO NOT take any Tylenol (Acetaminophen) containing products, such as Vicodin, Percocet, Excedrin, and cold medications for the next 6 hours (until 12PM). DO NOT TAKE MORE THAN 3000 MG OF TYLENOL in a 24 hour period.

## 2022-05-25 NOTE — ASU DISCHARGE PLAN (ADULT/PEDIATRIC) - ASU DC SPECIAL INSTRUCTIONSFT
[facility]  CANCER  I  N  S  T  I  T  U  T E     Department of Surgery  Division of Surgical Oncology    Alfredito Plata M.D., EUGENIO.TIARA.  Chief, Division of Surgical Oncology    Wyatt Mcnair M.D., F.A.C.S., F.A.S.ROSA.  Associate , Department of Surgery    Chris Hinton M.D., F.A.C.S.  Bulmaro Vides M.D., F.A.C.S.  Benigno Riojas M.D., F.A.C.S.  Clifford Roper M.D., F.A.C.S.  Bulmaro Ramos M.D., F.A.C.S.  Farooq Bui M.D., F.AASHISHC.S.      Breast Biopsy/Lumpectomy Post-operative Instructions  1.	Supportive bra- Please bring a sports/athletic bra with you on the day of your surgery.  You will wear it home from the hospital.  You should wear the supportive bra continuously for 48 hours after the procedure, including wearing it to sleep.  Therefore, you may wear your regular bra.  You may remove the bra to shower.  The sports bra will provide support, decrease the amount of swelling at the biopsy site, and make your recovery more comfortable.    2.	Wound dressing- There is a dressing on the wound, which consists of 2 layers.  The outer layer is a clear plastic dressing (called Tegaderm) which is waterproof.  This should remain in place for 2 days post-operatively.  On the 2nd post-operative day, you should remove the clear plastic Tegaderm dressing.  Underneath the Tegaderm dressing, there are white surgical tapes (called steri strips) which are directly adherent to the skin.  These should remain on the skin, and will peel off naturally.  All of the stitches are “internal” and will dissolve naturally.    3.	Showering/Bathing- You may shower over the dressing the very next day after surgery.  Allow the water to run over the dressing, but don not scrub the area.  It is best not to sit in a bathtub or swimming pool for at least one week after surgery.    4.	Activity level- You may resume most normal daily activity as tolerated, but avoid strenuous activities such as aerobics, jogging, exercising or heavy lifting for at least 1 week after surgery.  You may return to work in 1-2 days after surgery.  You may drive as long as you are not taking any prescription pain medication.    5.	Pain Medication- You may take the prescribed medication, or you may take extra-strength Tylenol as needed.  Please don't take aspirin, Motrin, Advil or any other anti-inflammatory medications, as these medications may cause bleeding or bruising.    6.	Follow-up Appointment- Please call the office to schedule your post-operative appointment which should be approximately 10 days after your surgery. Office [phone]    7.	Bruising/Bleeding/Swelling- It is normal for there to be some bruising and swelling at the breast biopsy site, and there may be some staining of blood on to the dressing.  Some discomfort at the surgical site is expected.  If your symptoms seem excessive, or if you have any question or concerns, please call the office.      Anesthesia Precautions:  For the next 12 hours do not:   •	drive a car,  •	drink alcohol, beer, or wine,   •	make important personal or business decisions  Diet:   •	Progress diet slowly unless otherwise indicated    Physician Notification  •	Any Prescription issues  •	Bleeding that does not stop  •	Persistent nausea and vomiting  •	Pain not relieved by medications  •	Fever greater than 101®F  •	Inability to tolerate liquids or foods  •	Unable to urinate after 8 hours  Discharge and Disposition  •	Discharge to home/ group home/assisted living  •	Accompanied by Family/Spouse/ Parents/ Significant Other/ Friend/ and or Caregiver    Follow Up Care:  •	In the event that you develop a complication and you are unable to reach your own physician, you may contact:  [phone] or go to the nearest Emergency Room.   •	Please call your surgeon to schedule your follow up appointment [phone]

## 2022-05-25 NOTE — ASU DISCHARGE PLAN (ADULT/PEDIATRIC) - CALL YOUR DOCTOR IF YOU HAVE ANY OF THE FOLLOWING:
Bleeding that does not stop/Swelling that gets worse/Wound/Surgical Site with redness, or foul smelling discharge or pus/Unable to urinate/Excessive diarrhea

## 2022-05-25 NOTE — ASU DISCHARGE PLAN (ADULT/PEDIATRIC) - NS MD DC FALL RISK RISK
For information on Fall & Injury Prevention, visit: [email or web address] OR  [email or web address] OR  https://www.cdc.gov/steadi/patient.html

## 2022-05-25 NOTE — PACU DISCHARGE NOTE - THE ANESTHESIA ORDERS USED IN THE PACU ORDER SET WILL BE DISCONTINUED UPON TRANSFER OF THIS PATIENT
Subjective: The patient is awake and alert. No problems overnight. Denies chest pain, angina, and dyspnea. Denies abdominal pain. Tolerating diet. No nausea or vomiting. Wants nicole out. Objective:    /60   Pulse 69   Temp 97.6 °F (36.4 °C) (Oral)   Resp 16   Ht 5' 1\" (1.549 m)   Wt 209 lb 3.5 oz (94.9 kg)   SpO2 98%   BMI 39.53 kg/m²     Heart:  RRR, no murmurs, gallops, or rubs.   Lungs:  CTA bilaterally, no wheeze, rales or rhonchi  Abd: bowel sounds present, nontender, nondistended, no masses  Extrem:  No clubbing, cyanosis, or edema    CBC with Differential:    Lab Results   Component Value Date    WBC 10.8 05/04/2021    RBC 2.67 05/04/2021    HGB 8.6 05/04/2021    HCT 29.6 05/04/2021     05/04/2021    .9 05/04/2021    MCH 32.2 05/04/2021    MCHC 29.1 05/04/2021    RDW 16.3 05/04/2021    NRBC 0.9 05/25/2020    SEGSPCT 71 08/16/2013    BANDSPCT 1 03/29/2016    METASPCT 0.9 05/10/2020    LYMPHOPCT 8.4 05/04/2021    PROMYELOPCT 0.9 05/09/2020    MONOPCT 5.8 05/04/2021    MYELOPCT 1.7 05/11/2020    BASOPCT 0.4 05/04/2021    MONOSABS 0.62 05/04/2021    LYMPHSABS 0.91 05/04/2021    EOSABS 0.05 05/04/2021    BASOSABS 0.04 05/04/2021     CMP:    Lab Results   Component Value Date     05/06/2021    K 4.6 05/06/2021    K 6.3 05/04/2021     05/06/2021    CO2 27 05/06/2021    BUN 45 05/06/2021    CREATININE 4.2 05/06/2021    GFRAA 13 05/06/2021    LABGLOM 10 05/06/2021    GLUCOSE 96 05/06/2021    GLUCOSE 149 10/12/2011    PROT 6.6 05/04/2021    LABALBU 2.6 05/04/2021    LABALBU 4.1 10/12/2011    CALCIUM 8.3 05/06/2021    BILITOT 0.2 05/04/2021    ALKPHOS 171 05/04/2021    AST 17 05/04/2021    ALT 11 05/04/2021        Assessment:    Patient Active Problem List   Diagnosis    Neurologic gait dysfunction    [facility] failure, acute on chronic ([facility]    Diabetes mellitus ([facility]    Hypertension    Arthritis    Kidney disease    Acute blood loss anemia    Knee problem    Anemia Statement Selected due to stage 3 chronic kidney disease    Primary osteoarthritis of both knees    Acute on chronic renal insufficiency    Acute renal failure (HCC)    Metabolic acidosis    Acute renal failure superimposed on chronic kidney disease, on chronic dialysis ([address]    Sepsis ([address]    2019 novel coronavirus disease (COVID-19)    GI bleed    Moderate protein-calorie malnutrition (HCC)    Hyperkalemia    PERLA (acute kidney injury) ([address]       Plan:  Continue current care  D/C when ok with Renal  D/C bonnie Tomas  8:12 AM  5/6/2021

## 2022-05-31 ENCOUNTER — LABORATORY RESULT (OUTPATIENT)
Age: 52
End: 2022-05-31

## 2022-05-31 ENCOUNTER — APPOINTMENT (OUTPATIENT)
Dept: SURGICAL ONCOLOGY | Facility: CLINIC | Age: 52
End: 2022-05-31
Payer: COMMERCIAL

## 2022-05-31 PROBLEM — J45.909 UNSPECIFIED ASTHMA, UNCOMPLICATED: Chronic | Status: ACTIVE | Noted: 2022-03-29

## 2022-05-31 PROBLEM — E78.5 HYPERLIPIDEMIA, UNSPECIFIED: Chronic | Status: ACTIVE | Noted: 2018-04-03

## 2022-05-31 PROBLEM — N63.20 UNSPECIFIED LUMP IN THE LEFT BREAST, UNSPECIFIED QUADRANT: Chronic | Status: ACTIVE | Noted: 2022-05-24

## 2022-05-31 PROBLEM — N64.89 OTHER SPECIFIED DISORDERS OF BREAST: Chronic | Status: ACTIVE | Noted: 2022-05-24

## 2022-05-31 LAB — SURGICAL PATHOLOGY STUDY: SIGNIFICANT CHANGE UP

## 2022-05-31 PROCEDURE — 99024 POSTOP FOLLOW-UP VISIT: CPT

## 2022-05-31 NOTE — HISTORY OF PRESENT ILLNESS
[de-identified] : s/p evacuation hematoma. Incision closed loosely and packed. Now with drainage but no erythema and tenderness. No fever

## 2022-05-31 NOTE — ASSESSMENT
[FreeTextEntry1] : Imp; probable surgicell decomposing and draining around packing. Packing changed. Culture sent.\par \par Plan: Cipro renewed.\par          Sutures out 2-5 days

## 2022-06-01 ENCOUNTER — APPOINTMENT (OUTPATIENT)
Dept: WOUND CARE | Facility: CLINIC | Age: 52
End: 2022-06-01
Payer: COMMERCIAL

## 2022-06-01 VITALS
WEIGHT: 200 LBS | BODY MASS INDEX: 30.41 KG/M2 | DIASTOLIC BLOOD PRESSURE: 88 MMHG | HEART RATE: 108 BPM | SYSTOLIC BLOOD PRESSURE: 127 MMHG | TEMPERATURE: 97.3 F | RESPIRATION RATE: 16 BRPM

## 2022-06-01 PROCEDURE — 99204 OFFICE O/P NEW MOD 45 MIN: CPT

## 2022-06-01 RX ORDER — FAMOTIDINE 40 MG/1
40 TABLET, FILM COATED ORAL TWICE DAILY
Qty: 180 | Refills: 1 | Status: COMPLETED | COMMUNITY
Start: 2020-04-14 | End: 2022-06-01

## 2022-06-01 RX ORDER — OXYCODONE 5 MG/1
5 TABLET ORAL
Qty: 24 | Refills: 0 | Status: COMPLETED | COMMUNITY
Start: 2022-04-11 | End: 2022-06-01

## 2022-06-01 RX ORDER — TIOTROPIUM BROMIDE INHALATION SPRAY 3.12 UG/1
2.5 SPRAY, METERED RESPIRATORY (INHALATION)
Qty: 3 | Refills: 1 | Status: COMPLETED | COMMUNITY
Start: 2021-01-21 | End: 2022-06-01

## 2022-06-01 RX ORDER — VALACYCLOVIR 1 G/1
1 TABLET, FILM COATED ORAL
Qty: 20 | Refills: 0 | Status: COMPLETED | COMMUNITY
Start: 2021-08-02 | End: 2022-06-01

## 2022-06-01 RX ORDER — ALBUTEROL SULFATE 90 UG/1
108 (90 BASE) AEROSOL, METERED RESPIRATORY (INHALATION) EVERY 6 HOURS
Qty: 1 | Refills: 3 | Status: COMPLETED | COMMUNITY
Start: 2021-08-19 | End: 2022-06-01

## 2022-06-01 RX ORDER — FLUTICASONE PROPIONATE AND SALMETEROL 113; 14 UG/1; UG/1
113-14 POWDER, METERED RESPIRATORY (INHALATION)
Qty: 1 | Refills: 3 | Status: COMPLETED | COMMUNITY
Start: 2021-08-23 | End: 2022-06-01

## 2022-06-02 ENCOUNTER — APPOINTMENT (OUTPATIENT)
Dept: SURGICAL ONCOLOGY | Facility: CLINIC | Age: 52
End: 2022-06-02
Payer: COMMERCIAL

## 2022-06-02 PROCEDURE — 99024 POSTOP FOLLOW-UP VISIT: CPT

## 2022-06-06 ENCOUNTER — APPOINTMENT (OUTPATIENT)
Dept: WOUND CARE | Facility: CLINIC | Age: 52
End: 2022-06-06
Payer: COMMERCIAL

## 2022-06-06 PROCEDURE — 99213 OFFICE O/P EST LOW 20 MIN: CPT

## 2022-06-06 NOTE — PLAN
[FreeTextEntry1] : 6/6/22\par Plan - vac applied, white foam into undermining 10 - 11 o'clock\par nurse will be starting this week\par follow up 2 weeks

## 2022-06-06 NOTE — REVIEW OF SYSTEMS
[Skin Wound] : skin wound [Negative] : Heme/Lymph [FreeTextEntry2] : fever 5/28/22 restarted  on cipro culture was done 5/31/22 [de-identified] : open wound lt breast

## 2022-06-06 NOTE — PHYSICAL EXAM
[JVD] : no jugular venous distention  [Normal Breath Sounds] : Normal breath sounds [Abdomen Tenderness] : ~T ~M No abdominal tenderness [Skin Ulcer] : ulcer [Alert] : alert [Oriented to Person] : oriented to person [Oriented to Place] : oriented to place [Oriented to Time] : oriented to time [Calm] : calm [de-identified] : NAD, ambulatory [de-identified] : AT [de-identified] : supple [de-identified] : soft [de-identified] : nontender, no erythema, no pus, induration noted at 11 oclock [Please See PDF for [employer]] : Please See PDF for [employer].

## 2022-06-06 NOTE — HISTORY OF PRESENT ILLNESS
[FreeTextEntry1] : Ms. 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  The patient has complaints of nonhealing.   The patient has been dressing the wound with gauze-.  The patient denies fevers or chills.  The patient has no localized pain to the wound upon dressing changes.  The patient has no other complaints or associated symptoms.

## 2022-06-06 NOTE — REASON FOR VISIT
[Follow-Up: _____] : a [unfilled] follow-up visit [Procedure: _________] : a [unfilled] procedure visit [FreeTextEntry1] : post op large hematoma

## 2022-06-06 NOTE — ASSESSMENT
[FreeTextEntry1] : 6/1/22\par female 52 year old good health well nourished \par lt breast lumpectomy 4/11/22 /hematoma aspirated 5/2/22again 5/9 drain placed drain 5/11/22 drain removed 5/20 \par 5/22/22 dehis of incision line clotts drainage 250 cc \par 3 sutures present 2 openings that are communicating ( one wound ) \par depth 1.5  tunnels\par @ 10 oclock 5 cm \par @7 oclock 2.2 \par @ 11 oclock 6 cm \par pt to see surgeon tomorrow for removal 3 sutures \par plan today order vac \par iodorsorb packing done today with dry dressing change daily until vac is available \par pt to shower with packing removed / irrigate wound with ns repack \par supplies ordered \par f/u 1 week \par 6/6/22\par pt. received vac, applied today in office, white and black foam\par wound clean and pink\par

## 2022-06-07 NOTE — PLAN
[FreeTextEntry1] : 6/1/22\par lt breast lumpectomy 4/11/22 /hematoma aspirated 5/2/22again 5/9 drain placed drain 5/11/22 drain removed 5/20 \par 5/22/22 dehis of incision line clotts drainage 250 cc \par 3 sutures present 2 openings that are communicating ( one wound ) \par depth 1.5  tunnels\par @ 10 oclock 5 cm \par @7 oclock 2.2 \par @ 11 oclock 6 cm \par pt to see surgeon tomorrow for removal 3 sutures \par plan today order vac \par iodorsorb packing done today with dry dressing change daily until vac is available \par pt to shower with packing removed / irrigate wound with ns repack \par supplies ordered \par f/u 1 week \par

## 2022-06-07 NOTE — ASSESSMENT
[FreeTextEntry1] : 6/1/22\par female 52 year old good health well nourished \par lt breast lumpectomy 4/11/22 /hematoma aspirated 5/2/22again 5/9 drain placed drain 5/11/22 drain removed 5/20 \par 5/22/22 dehis of incision line clotts drainage 250 cc \par 3 sutures present 2 openings that are communicating ( one wound ) \par depth 1.5  tunnels\par @ 10 oclock 5 cm \par @7 oclock 2.2 \par @ 11 oclock 6 cm \par pt to see surgeon tomorrow for removal 3 sutures \par plan today order vac \par iodorsorb packing done today with dry dressing change daily until vac is available \par pt to shower with packing removed / irrigate wound with ns repack \par supplies ordered \par f/u 1 week \par

## 2022-06-07 NOTE — REVIEW OF SYSTEMS
[Skin Wound] : skin wound [Negative] : Heme/Lymph [FreeTextEntry2] : fever 5/28/22 restarted  on cipro culture was done 5/31/22 [de-identified] : open wound lt breast

## 2022-06-21 ENCOUNTER — APPOINTMENT (OUTPATIENT)
Dept: WOUND CARE | Facility: CLINIC | Age: 52
End: 2022-06-21
Payer: COMMERCIAL

## 2022-06-21 PROCEDURE — 99215 OFFICE O/P EST HI 40 MIN: CPT

## 2022-06-23 NOTE — PLAN
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

## 2022-06-23 NOTE — ASSESSMENT
[FreeTextEntry1] : 6/1/22\par female 52 year old good health well nourished \par lt breast lumpectomy 4/11/22 /hematoma aspirated 5/2/22again 5/9 drain placed drain 5/11/22 drain removed 5/20 \par 5/22/22 dehis of incision line clotts drainage 250 cc \par 3 sutures present 2 openings that are communicating ( one wound ) \par depth 1.5  tunnels\par @ 10 oclock 5 cm \par @7 oclock 2.2 \par @ 11 oclock 6 cm \par pt to see surgeon tomorrow for removal 3 sutures \par plan today order vac \par iodorsorb packing done today with dry dressing change daily until vac is available \par pt to shower with packing removed / irrigate wound with ns repack \par supplies ordered \par f/u 1 week \par 6/6/22\par pt. received vac, applied today in office, white and black foam\par wound clean and pink\par 6/21/22\par vac on for 2 weeks, undermine into 10 -11 o'clock area 4.2 cm, white foam into area,  appears that it is being packed to snuggly so area not allowing to close in\par

## 2022-06-23 NOTE — REVIEW OF SYSTEMS
[Skin Wound] : skin wound [Negative] : Heme/Lymph [FreeTextEntry2] : fever 5/28/22 restarted  on cipro culture was done 5/31/22 [de-identified] : open wound lt breast

## 2022-06-23 NOTE — PHYSICAL EXAM
[Normal Breath Sounds] : Normal breath sounds [Skin Ulcer] : ulcer [Alert] : alert [Oriented to Person] : oriented to person [Oriented to Place] : oriented to place [Oriented to Time] : oriented to time [Calm] : calm [Please See PDF for [employer]] : Please See PDF for [employer]. [JVD] : no jugular venous distention  [Abdomen Tenderness] : ~T ~M No abdominal tenderness [de-identified] : NAD, ambulatory [de-identified] : AT [de-identified] : supple [de-identified] : soft [de-identified] : nontender, no erythema, no pus, induration noted at 11 oclock

## 2022-06-30 ENCOUNTER — APPOINTMENT (OUTPATIENT)
Dept: WOUND CARE | Facility: CLINIC | Age: 52
End: 2022-06-30

## 2022-06-30 VITALS
DIASTOLIC BLOOD PRESSURE: 82 MMHG | HEART RATE: 92 BPM | BODY MASS INDEX: 30.41 KG/M2 | WEIGHT: 200 LBS | TEMPERATURE: 97.4 F | SYSTOLIC BLOOD PRESSURE: 127 MMHG | RESPIRATION RATE: 16 BRPM

## 2022-06-30 DIAGNOSIS — T81.30XA DISRUPTION OF WOUND, UNSPECIFIED, INITIAL ENCOUNTER: ICD-10-CM

## 2022-06-30 PROCEDURE — 99215 OFFICE O/P EST HI 40 MIN: CPT

## 2022-07-07 PROBLEM — T81.30XA DEHISCENCE OF WOUND: Status: ACTIVE | Noted: 2022-07-07

## 2022-07-07 NOTE — ASSESSMENT
[FreeTextEntry1] : 6/1/22\par female 52 year old good health well nourished \par lt breast lumpectomy 4/11/22 /hematoma aspirated 5/2/22again 5/9 drain placed drain 5/11/22 drain removed 5/20 \par 5/22/22 dehis of incision line clotts drainage 250 cc \par 3 sutures present 2 openings that are communicating ( one wound ) \par depth 1.5  tunnels\par @ 10 oclock 5 cm \par @7 oclock 2.2 \par @ 11 oclock 6 cm \par pt to see surgeon tomorrow for removal 3 sutures \par plan today order vac \par iodorsorb packing done today with dry dressing change daily until vac is available \par pt to shower with packing removed / irrigate wound with ns repack \par supplies ordered \par f/u 1 week \par 6/6/22\par pt. received vac, applied today in office, white and black foam\par wound clean and pink\par 6/21/22\par vac on for 2 weeks, undermine into 10 -11 o'clock area 4.2 cm, white foam into area,  appears that it is being packed to snuggly so area not allowing to close in\par \par 6/30/22\par vac therapy using white foam into tunnel, tunnel @ 10-11 o'clock 2.2  and depth 1 cm  improved \par s/p excisional debridement \par

## 2022-07-07 NOTE — PLAN
[FreeTextEntry1] : 6/30/22\par Plan - shower and rinse wound, white foam into area of undermining, instructed to pull back after inserting to end to allow closure of area, small piece black foam over opening of wound.\par follow up 2 weeks

## 2022-07-07 NOTE — REVIEW OF SYSTEMS
[Skin Wound] : skin wound [Negative] : Heme/Lymph [FreeTextEntry2] : fever 5/28/22 restarted  on cipro culture was done 5/31/22 [de-identified] : open wound lt breast

## 2022-07-07 NOTE — PHYSICAL EXAM
[Normal Breath Sounds] : Normal breath sounds [Skin Ulcer] : ulcer [Alert] : alert [Oriented to Person] : oriented to person [Oriented to Place] : oriented to place [Oriented to Time] : oriented to time [Calm] : calm [Please See PDF for [employer]] : Please See PDF for [employer]. [JVD] : no jugular venous distention  [Abdomen Tenderness] : ~T ~M No abdominal tenderness [de-identified] : AT [de-identified] : NAD, ambulatory [de-identified] : supple [de-identified] : soft [de-identified] : nontender, no erythema, no pus, induration noted at 11 oclock

## 2022-07-14 ENCOUNTER — APPOINTMENT (OUTPATIENT)
Dept: WOUND CARE | Facility: CLINIC | Age: 52
End: 2022-07-14

## 2022-07-14 VITALS
BODY MASS INDEX: 30.41 KG/M2 | DIASTOLIC BLOOD PRESSURE: 80 MMHG | RESPIRATION RATE: 16 BRPM | SYSTOLIC BLOOD PRESSURE: 117 MMHG | WEIGHT: 200 LBS | HEART RATE: 96 BPM | TEMPERATURE: 97.3 F

## 2022-07-14 PROCEDURE — 99214 OFFICE O/P EST MOD 30 MIN: CPT

## 2022-07-14 NOTE — PHYSICAL EXAM
[Normal Breath Sounds] : Normal breath sounds [Skin Ulcer] : ulcer [Alert] : alert [Oriented to Person] : oriented to person [Oriented to Place] : oriented to place [Oriented to Time] : oriented to time [Calm] : calm [Please See PDF for [employer]] : Please See PDF for [employer]. [JVD] : no jugular venous distention  [Abdomen Tenderness] : ~T ~M No abdominal tenderness [de-identified] : NAD, ambulatory [de-identified] : AT [de-identified] : supple [de-identified] : soft [de-identified] : nontender, no erythema, no pus, induration noted at 11 oclock

## 2022-07-14 NOTE — REVIEW OF SYSTEMS
[Skin Wound] : skin wound [Negative] : Heme/Lymph [FreeTextEntry2] : fever 5/28/22 restarted  on cipro culture was done 5/31/22 [de-identified] : open wound lt breast

## 2022-07-14 NOTE — ASSESSMENT
[FreeTextEntry1] : 6/1/22\par female 52 year old good health well nourished \par lt breast lumpectomy 4/11/22 /hematoma aspirated 5/2/22again 5/9 drain placed drain 5/11/22 drain removed 5/20 \par 5/22/22 dehis of incision line clotts drainage 250 cc \par 3 sutures present 2 openings that are communicating ( one wound ) \par depth 1.5  tunnels\par @ 10 oclock 5 cm \par @7 oclock 2.2 \par @ 11 oclock 6 cm \par pt to see surgeon tomorrow for removal 3 sutures \par plan today order vac \par iodorsorb packing done today with dry dressing change daily until vac is available \par pt to shower with packing removed / irrigate wound with ns repack \par supplies ordered \par f/u 1 week \par 6/6/22\par pt. received vac, applied today in office, white and black foam\par wound clean and pink\par 6/21/22\par vac on for 2 weeks, undermine into 10 -11 o'clock area 4.2 cm, white foam into area,  appears that it is being packed to snuggly so area not allowing to close in\par \par 6/30/22\par vac therapy using white foam into tunnel, tunnel @ 10-11 o'clock 2.2  and depth 1 cm  improved \par s/p excisional debridement \par 7/14/22\par improved 0.4 depth to left breast \par clean and pink\par

## 2022-07-14 NOTE — PLAN
[FreeTextEntry1] : 7/14/22\par Plan - d/c vac\par shower wound with soap and water\par start aquacel, foam adhesive over \par supplies ordered\par follow up 3-4 weeks

## 2022-08-02 ENCOUNTER — APPOINTMENT (OUTPATIENT)
Dept: PULMONOLOGY | Facility: CLINIC | Age: 52
End: 2022-08-02

## 2022-08-02 ENCOUNTER — NON-APPOINTMENT (OUTPATIENT)
Age: 52
End: 2022-08-02

## 2022-08-02 DIAGNOSIS — J12.82 COVID-19: ICD-10-CM

## 2022-08-02 DIAGNOSIS — S60.222A CONTUSION OF LEFT HAND, INITIAL ENCOUNTER: ICD-10-CM

## 2022-08-02 DIAGNOSIS — H16.002 UNSPECIFIED CORNEAL ULCER, LEFT EYE: ICD-10-CM

## 2022-08-02 DIAGNOSIS — N64.89 OTHER SPECIFIED DISORDERS OF BREAST: ICD-10-CM

## 2022-08-02 DIAGNOSIS — R50.9 FEVER, UNSPECIFIED: ICD-10-CM

## 2022-08-02 DIAGNOSIS — U07.1 COVID-19: ICD-10-CM

## 2022-08-02 DIAGNOSIS — S21.002A UNSPECIFIED OPEN WOUND OF LEFT BREAST, INITIAL ENCOUNTER: ICD-10-CM

## 2022-08-02 PROCEDURE — 99214 OFFICE O/P EST MOD 30 MIN: CPT | Mod: 25

## 2022-08-02 PROCEDURE — 95012 NITRIC OXIDE EXP GAS DETER: CPT

## 2022-08-02 PROCEDURE — 94010 BREATHING CAPACITY TEST: CPT

## 2022-08-02 RX ORDER — CIPROFLOXACIN HYDROCHLORIDE 250 MG/1
250 TABLET, FILM COATED ORAL
Qty: 14 | Refills: 0 | Status: DISCONTINUED | COMMUNITY
Start: 2022-05-22 | End: 2022-08-02

## 2022-08-02 NOTE — PROCEDURE
[FreeTextEntry1] : PFT reveals normal flows, with an FEV1 of 2.64L, which is 81% of predicted, with normal flow volume loop \par \par FENO was 10; normal value being less than 25\par Fractional exhaled nitric oxide (FENO) is regarded as a simple, noninvasive method for assessing eosinophilic airway inflammation. Produced by a variety of cells within the lung, nitric oxide (NO) concentrations are generally low in healthy individuals. However, high concentrations of NO appear to be involved in nonspecific host defense mechanisms and chronic inflammatory diseases such as asthma. The American Thoracic Society (ATS) therefore has recommended using FENO to aid in the diagnosis and monitoring of eosinophilic airway inflammation and asthma, and for identifying steroid responsive individuals whose chronic respiratory symptoms may be airway inflammation.

## 2022-08-02 NOTE — ADDENDUM
[FreeTextEntry1] : Documented by Lupillo Lee acting as a scribe for Dr. Damion Poole on 08/02/2022 \par \par All medical record entries made by the Scribe were at my, Dr. Damion Poole's, direction and personally dictated by me on 08/02/2022 . I have reviewed the chart and agree that the record accurately reflects my personal performance of the history, physical exam, assessment and plan. I have also personally directed, reviewed, and agree with the discharge instructions

## 2022-08-02 NOTE — HISTORY OF PRESENT ILLNESS
[TextBox_4] : Ms. GIL is a 52 year old female with hx of calcified tendinitis, acne, ADD, eczema, who presents to the office today for pulmonary follow up. Her chief complaint is \par \par -she notes generally feeling well \par -she notes s/p lumpectomy complicated by hematoma\par -she notes she's in the process of recovery but not 100% yet\par -she notes good quality sleep\par -she denies dysphonia\par -she notes d/c omeprazole due to constipation\par -she notes weight is stable\par -she notes fluctuating endurance\par -s/p covid 19 vaccine x 3 \par -she notes exercising ([place], walking)\par \par \par -She denies any visual issues, headaches, nausea, vomiting, fever, chills, sweats, chest pains, chest pressure, diarrhea, dysphagia, myalgia, dizziness, leg swelling, leg pain, itchy eyes, itchy ears, heartburn, reflux, or sour taste in the mouth.

## 2022-08-02 NOTE — ASSESSMENT
[FreeTextEntry1] : Ms. GIL is a 52 year old female with a history of calcified tendinitis, acne, ADD, eczema, allergies, herniated disc  who presents to the office for pulmonary follow up, s/p COVID-19 positive / asthmatic bronchitis (3/2020) - still SOB ([facility])/ Reflux present - Post covid symptoms - resolved- DX OSAS-  vax up to date -stable\par \par The patient's shortness of breath is multifactorial due to:\par -pulmonary disease \par      - COVID-19 infection / pneumonitis \par     - asthmatic bronchitis \par -poor breathing mechanics \par -overweight/out of shape (now)\par -(doubt)cardiac disease\par \par Problem 1: s/p COVID-19 (+) / pneumonitis (not completely symptomatically resolved)-\par - S/p Covid 19 vaccine (Pfizer) x2 and JNJ x1 \par -Covid 19 vaccine discussed at length with patient; booster discussed\par - s/p Zithromax x2, and  Plaquenil \par - continue monitor temperature curve \par Immune Support Recommendations: \par -OTC Vitamin C 500mg BID \par -OTC Quercetin 250-500mg BID \par -OTC Zinc 75-100mg per day \par -OTC Melatonin 1or 2mg a night \par -OTC Vitamin D 1-4000mg per day \par -OTC Tonic Water 8oz per day\par \par Additional Supplements:\par -Liposomal glutathione\par -Berberine\par -SPM\par \par Problem 2: asthma (controlled) \par - continue Spiriva 2 qDay\par - continue Singulair 10 mg before bed\par -continue AirDuo 230 - 2 inhalations BID\par - continue Albuterol (0.83) by nebulizer QID  (gargle and rinse after use)\par -Asthma is believed to be caused by inherited (genetic) and environmental factor, but its exact cause is unknown. Asthma may be triggered by allergens, lung infections, or irritants in the air. Asthma triggers are different for each person\par - You have a clinical scenario most c/q acute bronchitis the etiology of which is unknown but empiric antibiotics are indicated. Hydration, mucolytics including mucinex, robitussin and the like are indicated. Cough controlling agents will be needed.\par \par Problem 3: Allergies / sinus\par - Continue Zyrtec / Xyzal \par -continue Flonase 1 sniff each nostril BID\par -get Blood work to include: asthma panel, food IgE panel, IgE level, eosinophil level, vitamin D level \par -Environmental measures for allergies were encouraged including mattress and pillow cover, air purifier, and environmental controls.\par \par Problem 4: GERD\par -continue Omeprazole 40 mg QOD pre- breakfast \par -continue Pepcid 40 mg QHS\par -Rule of 2s: avoid eating too much, eating too fast, eating too late, eating too spicy, eating too lousy, eating two hours before bed.\par -Things to avoid including overeating, spicy foods, tight clothing, eating within three hours of bed, this list is not all inclusive. \par -For treatment of reflux, possible options discussed including diet control, H2 blockers, PPIs, as well as coating motility agents discussed as treatment options. Timing of meals and proximity of last meal to sleep were discussed. If symptoms persist, a formal gastrointestinal evaluation is needed.\par \par Problem 5: Chronic Cough (resolved) \par - continue Tessalon Perles \par - Any cough greater than three weeks duration-differential diagnosis includes-asthma, upper airway cough syndrome, post nasal drip syndrome, gastroesophageal reflux, laryngopharyngeal reflux, cardiac disease (congestive heart failure, medicines, effects, etc), medication effects (b-blockers, ace inhibitors, ARBs, glaucoma meds, etc.), smoking, infectious, multifactorial, etc.\par \par Problem 6: Poor Mechanics of Breathing\par -recommended breathing techniques Jenni Bedolla \par - Proper breathing techniques were reviewed with an emphasis of exhalation. Patient instructed to breath in for 1 second and out for four seconds. Patient was encouraged to not talk while walking. \par \par Problem 7: Overweight / out of shape (now)- 20 lbs\par Recommended "10-Day Detox" by Marcio Jaffe for 2-3 weeks followed by probiotics  \par -Weight loss, exercise, and diet control were discussed and are highly encouraged. Treatment options were given such as, aqua therapy, and contacting a nutritionist. Recommended to use the elliptical, stationary bike, less use of treadmill. Mindful eating was explained to the patient Obesity is associated with worsening asthma, shortness of breath, and potential for cardiac disease, diabetes, and other underlying medical conditions. \par \par Problem 8: (+) mild ELIDA - DD unable due to teeth pain \par -s/p Home Sleep Study (12.2) \par -s/p Dr. Janice Aguilar evaluation for Oral Appliance Therapy - ? surgery, somnifix\par - Sleep apnea is associated with adverse clinical consequences which can affect most organ systems. Cardiovascular disease risk includes arrhythmias, atrial fibrillation, hypertension, coronary artery disease, and stroke. Metabolic disorders include diabetes type 2, non-alcoholic fatty liver disease. Mood disorder especially depression; and cognitive decline especially in the elderly. Associations with chronic reflux/Danielson’s esophagus some but not all inclusive. \par -Reasons include arousal consistent with hypopnea; respiratory events most prominent in REM sleep or supine position; therefore sleep staging and body position are important for accurate diagnosis and estimation of AHI. \par \par Problem 9: health maintenance\par -s/p influenza vaccine 2021 \par -recommended strep pneumonia vaccines after age 65: Prevnar-13 vaccine, followed by Pneumo vaccine 23 one year following\par -recommended early intervention for URIs\par -recommended regular osteoporosis evaluations\par -recommended early dermatological evaluations\par -recommended after the age of 50 to the age of 70, colonoscopy every 5 years \par \par  Follow up in 6 months \par -he  is recommended to call with any changes, questions, or concerns.\par

## 2022-08-02 NOTE — PHYSICAL EXAM
[No Acute Distress] : no acute distress [Well Nourished] : well nourished [Normal Appearance] : normal appearance [Well Groomed] : well groomed [No Deformities] : no deformities [Well Developed] : well developed [II] : Mallampati Class: II [TextBox_68] : I:E ratio 1:3; clear

## 2022-08-09 ENCOUNTER — APPOINTMENT (OUTPATIENT)
Dept: WOUND CARE | Facility: CLINIC | Age: 52
End: 2022-08-09

## 2022-08-09 DIAGNOSIS — Z87.828 PERSONAL HISTORY OF OTHER (HEALED) PHYSICAL INJURY AND TRAUMA: ICD-10-CM

## 2022-08-09 PROCEDURE — 99213 OFFICE O/P EST LOW 20 MIN: CPT | Mod: 95

## 2022-08-09 NOTE — REVIEW OF SYSTEMS
[Skin Wound] : skin wound [Negative] : Heme/Lymph [FreeTextEntry2] : fever 5/28/22 restarted  on cipro culture was done 5/31/22 [de-identified] : open wound lt breast

## 2022-08-09 NOTE — PHYSICAL EXAM
[Normal Breath Sounds] : Normal breath sounds [Skin Ulcer] : ulcer [Alert] : alert [Oriented to Person] : oriented to person [Oriented to Place] : oriented to place [Oriented to Time] : oriented to time [Calm] : calm [Please See PDF for [employer]] : Please See PDF for [employer]. [JVD] : no jugular venous distention  [Abdomen Tenderness] : ~T ~M No abdominal tenderness [de-identified] : NAD, ambulatory [de-identified] : AT [de-identified] : supple [de-identified] : soft [de-identified] : nontender, no erythema, no pus, induration noted at 11 oclock

## 2022-08-09 NOTE — ASSESSMENT
[FreeTextEntry1] : 6/1/22\par female 52 year old good health well nourished \par lt breast lumpectomy 4/11/22 /hematoma aspirated 5/2/22again 5/9 drain placed drain 5/11/22 drain removed 5/20 \par 5/22/22 dehis of incision line clotts drainage 250 cc \par 3 sutures present 2 openings that are communicating ( one wound ) \par depth 1.5  tunnels\par @ 10 oclock 5 cm \par @7 oclock 2.2 \par @ 11 oclock 6 cm \par pt to see surgeon tomorrow for removal 3 sutures \par plan today order vac \par iodorsorb packing done today with dry dressing change daily until vac is available \par pt to shower with packing removed / irrigate wound with ns repack \par supplies ordered \par f/u 1 week \par 6/6/22\par pt. received vac, applied today in office, white and black foam\par wound clean and pink\par 6/21/22\par vac on for 2 weeks, undermine into 10 -11 o'clock area 4.2 cm, white foam into area,  appears that it is being packed to snuggly so area not allowing to close in\par \par 6/30/22\par vac therapy using white foam into tunnel, tunnel @ 10-11 o'clock 2.2  and depth 1 cm  improved \par s/p excisional debridement \par 7/14/22\par improved 0.4 depth to left breast \par clean and pink\par 8/9/22\par wound is now healed\par feels good

## 2022-09-19 NOTE — ASU PREOP CHECKLIST - NOTHING BY MOUTH SINCE
Remote transmission received from patient's dual chamber ICD home monitor. Transmission shows normal sensing and pacing function. AT/ AF burden 62.6% (coumadin, amiodarone, Toprol). Optivol is WNL. TriageHF Heart Failure Risk Status on 19-Sep-2022 is Medium*    EP/ NP will review. See interrogation under cardiology tab in the [address] [place] [address] [address] field for more details. Will continue to monitor remotely. (End of 91-day monitoring period 9/19/22).
25-May-2022 06:00

## 2022-10-10 NOTE — ASU PREOPERATIVE ASSESSMENT, ADULT ([facility] ONLY) - LOCKER #
"Goal Outcome Evaluation:    [id number]    AVSS on room air. A+Ox4. Up SBA in room/hallways. Generalized abd/back pain controlled with buprenorphine patch (L arm) & PRN oxy, robaxin. R hand PIV running NS @ 100 ml/hr. Second R PIV SL. Somewhat tolerating small amts of regular diet. Intermittent nausea controlled with PRN zofran and compazine. No emesis noted. Continent of B/B. Voiding spontaneously. Reports LBM \"about a week ago\". Will continue with plan of care.                  " 13

## 2022-10-18 LAB
25(OH)D3 SERPL-MCNC: 36.5 NG/ML
ALBUMIN SERPL ELPH-MCNC: 4.6 G/DL
ALP BLD-CCNC: 88 U/L
ALT SERPL-CCNC: 21 U/L
ANION GAP SERPL CALC-SCNC: 15 MMOL/L
APPEARANCE: CLEAR
AST SERPL-CCNC: 18 U/L
BASOPHILS # BLD AUTO: 0.06 K/UL
BASOPHILS NFR BLD AUTO: 1.1 %
BILIRUB SERPL-MCNC: 0.3 MG/DL
BILIRUBIN URINE: NEGATIVE
BLOOD URINE: NEGATIVE
BUN SERPL-MCNC: 10 MG/DL
CALCIUM SERPL-MCNC: 10.2 MG/DL
CHLORIDE SERPL-SCNC: 104 MMOL/L
CHOLEST SERPL-MCNC: 222 MG/DL
CO2 SERPL-SCNC: 24 MMOL/L
COLOR: NORMAL
CREAT SERPL-MCNC: 0.82 MG/DL
EGFR: 86 ML/MIN/1.73M2
EOSINOPHIL # BLD AUTO: 0.1 K/UL
EOSINOPHIL NFR BLD AUTO: 1.8 %
ESTIMATED AVERAGE GLUCOSE: 117 MG/DL
GLUCOSE QUALITATIVE U: NEGATIVE
GLUCOSE SERPL-MCNC: 108 MG/DL
HBA1C MFR BLD HPLC: 5.7 %
HCT VFR BLD CALC: 43.3 %
HDLC SERPL-MCNC: 66 MG/DL
HGB BLD-MCNC: 13.8 G/DL
IMM GRANULOCYTES NFR BLD AUTO: 0.2 %
KETONES URINE: NEGATIVE
LDLC SERPL CALC-MCNC: 128 MG/DL
LEUKOCYTE ESTERASE URINE: NEGATIVE
LYMPHOCYTES # BLD AUTO: 2.08 K/UL
LYMPHOCYTES NFR BLD AUTO: 38.2 %
MAN DIFF?: NORMAL
MCHC RBC-ENTMCNC: 31.7 PG
MCHC RBC-ENTMCNC: 31.9 GM/DL
MCV RBC AUTO: 99.3 FL
MONOCYTES # BLD AUTO: 0.43 K/UL
MONOCYTES NFR BLD AUTO: 7.9 %
NEUTROPHILS # BLD AUTO: 2.76 K/UL
NEUTROPHILS NFR BLD AUTO: 50.8 %
NITRITE URINE: NEGATIVE
NONHDLC SERPL-MCNC: 156 MG/DL
PH URINE: 6
PLATELET # BLD AUTO: 349 K/UL
POTASSIUM SERPL-SCNC: 4.6 MMOL/L
PROT SERPL-MCNC: 7.1 G/DL
PROTEIN URINE: NEGATIVE
RBC # BLD: 4.36 M/UL
RBC # FLD: 12.4 %
SODIUM SERPL-SCNC: 143 MMOL/L
SPECIFIC GRAVITY URINE: 1.02
T4 FREE SERPL-MCNC: 1.2 NG/DL
TRIGL SERPL-MCNC: 142 MG/DL
TSH SERPL-ACNC: 3.34 UIU/ML
UROBILINOGEN URINE: NORMAL
VIT B12 SERPL-MCNC: 535 PG/ML
WBC # FLD AUTO: 5.44 K/UL

## 2022-10-19 ENCOUNTER — APPOINTMENT (OUTPATIENT)
Dept: INTERNAL MEDICINE | Facility: CLINIC | Age: 52
End: 2022-10-19

## 2022-10-19 ENCOUNTER — NON-APPOINTMENT (OUTPATIENT)
Age: 52
End: 2022-10-19

## 2022-10-19 VITALS
WEIGHT: 214 LBS | DIASTOLIC BLOOD PRESSURE: 82 MMHG | RESPIRATION RATE: 16 BRPM | HEIGHT: 68 IN | HEART RATE: 96 BPM | BODY MASS INDEX: 32.43 KG/M2 | TEMPERATURE: 97.9 F | SYSTOLIC BLOOD PRESSURE: 118 MMHG | OXYGEN SATURATION: 98 %

## 2022-10-19 DIAGNOSIS — Z00.00 ENCOUNTER FOR GENERAL ADULT MEDICAL EXAMINATION W/OUT ABNORMAL FINDINGS: ICD-10-CM

## 2022-10-19 PROCEDURE — 99396 PREV VISIT EST AGE 40-64: CPT | Mod: 25

## 2022-10-19 PROCEDURE — G0444 DEPRESSION SCREEN ANNUAL: CPT | Mod: NC,59

## 2022-10-19 PROCEDURE — 93000 ELECTROCARDIOGRAM COMPLETE: CPT | Mod: 59

## 2022-10-19 RX ORDER — TRAMADOL HYDROCHLORIDE 50 MG/1
50 TABLET, COATED ORAL
Qty: 20 | Refills: 0 | Status: DISCONTINUED | COMMUNITY
Start: 2022-05-24 | End: 2022-10-19

## 2022-10-19 RX ORDER — OMEPRAZOLE 40 MG/1
40 CAPSULE, DELAYED RELEASE ORAL
Qty: 90 | Refills: 2 | Status: DISCONTINUED | COMMUNITY
Start: 2021-02-16 | End: 2022-10-19

## 2022-10-19 RX ORDER — MONTELUKAST 10 MG/1
10 TABLET, FILM COATED ORAL
Qty: 90 | Refills: 1 | Status: ACTIVE | COMMUNITY
Start: 2020-04-14 | End: 1900-01-01

## 2022-10-19 RX ORDER — ONDANSETRON 4 MG/1
4 TABLET ORAL
Qty: 12 | Refills: 0 | Status: DISCONTINUED | COMMUNITY
Start: 2022-05-23 | End: 2022-10-19

## 2022-10-19 NOTE — ASSESSMENT
[FreeTextEntry1] : HCM\par Labs discussed with pt\par Gyn, mammogram advised\par Bone density utd\par Repeat colonoscopy advised\par Derm advised\par Pt will consider shingrix\par continue current meds\par

## 2022-10-19 NOTE — HEALTH RISK ASSESSMENT
[Good] : ~his/her~  mood as  good [Yes] : Yes [2 - 3 times a week (3 pts)] : 2 - 3  times a week (3 points) [No] : In the past 12 months have you used drugs other than those required for medical reasons? No [No falls in past year] : Patient reported no falls in the past year [de-identified] : active [de-identified] : regular [None] : None [With Family] : lives with family [] :  [Feels Safe at Home] : Feels safe at home [Fully functional (bathing, dressing, toileting, transferring, walking, feeding)] : Fully functional (bathing, dressing, toileting, transferring, walking, feeding) [Fully functional (using the telephone, shopping, preparing meals, housekeeping, doing laundry, using] : Fully functional and needs no help or supervision to perform IADLs (using the telephone, shopping, preparing meals, housekeeping, doing laundry, using transportation, managing medications and managing finances) [Reports changes in hearing] : Reports no changes in hearing [Reports changes in vision] : Reports no changes in vision [Reports changes in dental health] : Reports no changes in dental health [Smoke Detector] : smoke detector [Carbon Monoxide Detector] : carbon monoxide detector [Seat Belt] :  uses seat belt

## 2022-10-19 NOTE — HISTORY OF PRESENT ILLNESS
[FreeTextEntry1] : Annual Physical [de-identified] : JOE GIL is a 53 yo woman with hypercholesterolemia, ELIDA, s/p left breast lumpectomy here for a physical.  She has been well overall.  Had left breast biopsy that showed intraductal papilloma, had lumpectomy and then developed breast hematoma and needed additional surgery.  Now recovered well.\par \par Gyn, mammogram, colonoscopy, derm advised.  Had prior colonoscopy with dr francia reynaga.  Tdap 2/21, flu, Pfizer utd.\par \par The patient is  with 2 children.  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

## 2022-10-19 NOTE — PHYSICAL EXAM
[No Acute Distress] : no acute distress [Well Nourished] : well nourished [Well Developed] : well developed [Well-Appearing] : well-appearing [Normal Sclera/Conjunctiva] : normal sclera/conjunctiva [PERRL] : pupils equal round and reactive to light [EOMI] : extraocular movements intact [Normal Outer Ear/Nose] : the outer ears and nose were normal in appearance [Normal Oropharynx] : the oropharynx was normal [Normal TMs] : both tympanic membranes were normal [Normal Nasal Mucosa] : the nasal mucosa was normal [No Lymphadenopathy] : no lymphadenopathy [Supple] : supple [Thyroid Normal, No Nodules] : the thyroid was normal and there were no nodules present [No Respiratory Distress] : no respiratory distress  [No Accessory Muscle Use] : no accessory muscle use [Clear to Auscultation] : lungs were clear to auscultation bilaterally [Normal Rate] : normal rate  [Regular Rhythm] : with a regular rhythm [Normal S1, S2] : normal S1 and S2 [No Murmur] : no murmur heard [No Edema] : there was no peripheral edema [Normal Appearance] : normal in appearance [No Masses] : no palpable masses [No Nipple Discharge] : no nipple discharge [No Axillary Lymphadenopathy] : no axillary lymphadenopathy [Soft] : abdomen soft [Non Tender] : non-tender [Non-distended] : non-distended [Normal Bowel Sounds] : normal bowel sounds [Normal Supraclavicular Nodes] : no supraclavicular lymphadenopathy [Normal Posterior Cervical Nodes] : no posterior cervical lymphadenopathy [Normal Anterior Cervical Nodes] : no anterior cervical lymphadenopathy [Coordination Grossly Intact] : coordination grossly intact [No Focal Deficits] : no focal deficits [Normal Gait] : normal gait [Deep Tendon Reflexes (DTR)] : deep tendon reflexes were 2+ and symmetric [Speech Grossly Normal] : speech grossly normal [Memory Grossly Normal] : memory grossly normal [Normal Affect] : the affect was normal [Alert and Oriented x3] : oriented to person, place, and time [Normal Mood] : the mood was normal [Normal Insight/Judgement] : insight and judgment were intact [de-identified] : healed scar left breast, medial to nipple

## 2022-11-16 ENCOUNTER — TRANSCRIPTION ENCOUNTER (OUTPATIENT)
Age: 52
End: 2022-11-16

## 2022-11-17 ENCOUNTER — TRANSCRIPTION ENCOUNTER (OUTPATIENT)
Age: 52
End: 2022-11-17

## 2023-01-18 ENCOUNTER — TRANSCRIPTION ENCOUNTER (OUTPATIENT)
Age: 53
End: 2023-01-18

## 2023-02-07 ENCOUNTER — APPOINTMENT (OUTPATIENT)
Dept: PULMONOLOGY | Facility: CLINIC | Age: 53
End: 2023-02-07
Payer: COMMERCIAL

## 2023-02-07 ENCOUNTER — NON-APPOINTMENT (OUTPATIENT)
Age: 53
End: 2023-02-07

## 2023-02-07 VITALS
HEART RATE: 93 BPM | RESPIRATION RATE: 16 BRPM | BODY MASS INDEX: 34.21 KG/M2 | SYSTOLIC BLOOD PRESSURE: 120 MMHG | OXYGEN SATURATION: 98 % | DIASTOLIC BLOOD PRESSURE: 80 MMHG | TEMPERATURE: 97.2 F | HEIGHT: 67 IN | WEIGHT: 218 LBS

## 2023-02-07 DIAGNOSIS — J30.9 ALLERGIC RHINITIS, UNSPECIFIED: ICD-10-CM

## 2023-02-07 DIAGNOSIS — K21.9 GASTRO-ESOPHAGEAL REFLUX DISEASE W/OUT ESOPHAGITIS: ICD-10-CM

## 2023-02-07 DIAGNOSIS — U07.1 COVID-19: ICD-10-CM

## 2023-02-07 DIAGNOSIS — R06.02 SHORTNESS OF BREATH: ICD-10-CM

## 2023-02-07 DIAGNOSIS — R05.3 CHRONIC COUGH: ICD-10-CM

## 2023-02-07 DIAGNOSIS — G47.33 OBSTRUCTIVE SLEEP APNEA (ADULT) (PEDIATRIC): ICD-10-CM

## 2023-02-07 DIAGNOSIS — R06.83 SNORING: ICD-10-CM

## 2023-02-07 PROCEDURE — 94010 BREATHING CAPACITY TEST: CPT

## 2023-02-07 PROCEDURE — 95012 NITRIC OXIDE EXP GAS DETER: CPT

## 2023-02-07 PROCEDURE — 99214 OFFICE O/P EST MOD 30 MIN: CPT | Mod: 25

## 2023-02-07 RX ORDER — ALBUTEROL SULFATE 2.5 MG/3ML
(2.5 MG/3ML) SOLUTION RESPIRATORY (INHALATION)
Qty: 120 | Refills: 3 | Status: ACTIVE | COMMUNITY
Start: 2023-02-07 | End: 1900-01-01

## 2023-02-07 NOTE — REASON FOR VISIT
[Follow-Up] : a follow-up visit [Abnormal CXR/ Chest CT] : an abnormal CXR/ chest CT [TextBox_44] : s/p COVID-19 positive, SOB, wheeze, chronic cough, GERD, RADS, ELIDA

## 2023-02-07 NOTE — ADDENDUM
[FreeTextEntry1] : Documented by Deandre Luna acting as a scribe for Dr. Damion Poole on 02/07/2023.\par \par All medical record entries made by the Scribe were at my, Dr. Damion Poole's, direction and personally dictated by me on 02/07/2023. I have reviewed the chart and agree that the record accurately reflects my personal performance of the history, physical exam, assessment and plan. I have also personally directed, reviewed, and agree with the discharge instructions.

## 2023-02-07 NOTE — ASSESSMENT
[FreeTextEntry1] : Ms. GIL is a 53 year old female with a history of calcified tendinitis, acne, ADD, eczema, allergies, herniated disc  who presents to the office for pulmonary follow up, s/p COVID-19 positive / asthmatic bronchitis (3/2020) - still SOB (MAHER)/ Reflux present - Post covid symptoms - resolved- DX OSAS-  vax up to date -stable - s/p URI 12/2022 - resolved\par \par The patient's shortness of breath is multifactorial due to:\par -pulmonary disease \par      - COVID-19 infection / pneumonitis \par     - asthmatic bronchitis \par -poor breathing mechanics \par -overweight/out of shape (now)\par -(doubt)cardiac disease\par \par Problem 1: s/p COVID-19 (+) / pneumonitis (not completely symptomatically resolved)-\par - S/p Covid 19 vaccine (Pfizer) x2 and JNJ x1 \par -Covid 19 vaccine discussed at length with patient; booster discussed\par - s/p Zithromax x2, and  Plaquenil \par - continue monitor temperature curve \par Immune Support Recommendations: \par -OTC Vitamin C 500mg BID \par -OTC Quercetin 250-500mg BID \par -OTC Zinc 75-100mg per day \par -OTC Melatonin 1or 2mg a night \par -OTC Vitamin D 1-4000mg per day \par -OTC Tonic Water 8oz per day\par \par Additional Supplements:\par -Liposomal glutathione\par -Berberine\par -SPM\par \par Problem 2: asthma (controlled) \par - continue Spiriva 2 qDay\par - continue Singulair 10 mg before bed\par -continue AirDuo 230 - 2 inhalations BID\par - continue Albuterol (0.83) by nebulizer QID  (gargle and rinse after use)\par -Asthma is believed to be caused by inherited (genetic) and environmental factor, but its exact cause is unknown. Asthma may be triggered by allergens, lung infections, or irritants in the air. Asthma triggers are different for each person\par - You have a clinical scenario most c/q acute bronchitis the etiology of which is unknown but empiric antibiotics are indicated. Hydration, mucolytics including mucinex, robitussin and the like are indicated. Cough controlling agents will be needed.\par \par Problem 3: Allergies / sinus\par - Continue Zyrtec / Xyzal \par -continue Flonase 1 sniff each nostril BID\par -get Blood work to include: asthma panel, food IgE panel, IgE level, eosinophil level, vitamin D level \par -Environmental measures for allergies were encouraged including mattress and pillow cover, air purifier, and environmental controls.\par \par Problem 4: GERD\par -continue Omeprazole 40 mg QOD pre- breakfast \par -continue Pepcid 40 mg QHS\par -Rule of 2s: avoid eating too much, eating too fast, eating too late, eating too spicy, eating too lousy, eating two hours before bed.\par -Things to avoid including overeating, spicy foods, tight clothing, eating within three hours of bed, this list is not all inclusive. \par -For treatment of reflux, possible options discussed including diet control, H2 blockers, PPIs, as well as coating motility agents discussed as treatment options. Timing of meals and proximity of last meal to sleep were discussed. If symptoms persist, a formal gastrointestinal evaluation is needed.\par \par Problem 5: Chronic Cough (resolved) \par - continue Tessalon Perles \par - Any cough greater than three weeks duration-differential diagnosis includes-asthma, upper airway cough syndrome, post nasal drip syndrome, gastroesophageal reflux, laryngopharyngeal reflux, cardiac disease (congestive heart failure, medicines, effects, etc), medication effects (b-blockers, ace inhibitors, ARBs, glaucoma meds, etc.), smoking, infectious, multifactorial, etc.\par \par Problem 6: Poor Mechanics of Breathing\par -recommended breathing techniques Jenni Bedolla \par - Proper breathing techniques were reviewed with an emphasis of exhalation. Patient instructed to breath in for 1 second and out for four seconds. Patient was encouraged to not talk while walking. \par \par Problem 7: Overweight / out of shape (now)- in progress\par Recommended "10-Day Detox" by Marcio Jaffe for 2-3 weeks followed by probiotics  \par -Weight loss, exercise, and diet control were discussed and are highly encouraged. Treatment options were given such as, aqua therapy, and contacting a nutritionist. Recommended to use the elliptical, stationary bike, less use of treadmill. Mindful eating was explained to the patient Obesity is associated with worsening asthma, shortness of breath, and potential for cardiac disease, diabetes, and other underlying medical conditions. \par \par Problem 8: (+) mild ELIDA - DD unable due to teeth pain \par -s/p Home Sleep Study (12.2) \par -s/p Dr. Janice Aguilar evaluation for Oral Appliance Therapy - ? surgery, somnifix\par - Sleep apnea is associated with adverse clinical consequences which can affect most organ systems. Cardiovascular disease risk includes arrhythmias, atrial fibrillation, hypertension, coronary artery disease, and stroke. Metabolic disorders include diabetes type 2, non-alcoholic fatty liver disease. Mood disorder especially depression; and cognitive decline especially in the elderly. Associations with chronic reflux/Danielson’s esophagus some but not all inclusive. \par -Reasons include arousal consistent with hypopnea; respiratory events most prominent in REM sleep or supine position; therefore sleep staging and body position are important for accurate diagnosis and estimation of AHI. \par \par Problem 9: health maintenance\par -Recommended Robbin Yusuf's [employer]ches \par -recommended Sanotize anti viral nasal spray in case of viral infection \par -currently engaging in yoga\par -s/p influenza vaccine 2022\par -recommended strep pneumonia vaccines after age 65: Prevnar-13 vaccine, followed by Pneumo vaccine 23 one year following\par -recommended early intervention for URIs\par -recommended regular osteoporosis evaluations\par -recommended early dermatological evaluations\par -recommended after the age of 50 to the age of 70, colonoscopy every 5 years \par \par  Follow up in 6 months \par -he  is recommended to call with any changes, questions, or concerns.\par

## 2023-02-07 NOTE — PROCEDURE
Skilled nurse visit in the home, for discontinuation of chemotherapy. 4370 mg of Fluorouracil infused over 46 hours    Mateo YANCEY RN  [phone]  [email or web address]   
[FreeTextEntry1] : Feno was 18; a normal value being less than 25. Fractional exhaled nitric oxide (FENO) is regarded as a simple, noninvasive method for assessing eosinophilic airway inflammation. Produced by a variety of cells within the lung, nitric oxide (NO) concentrations are generally low in healthy individuals. However, high concentrations of NO appear to be involved in nonspecific host defense mechanisms and chronic inflammatory  diseases such as asthma. The American Thoracic Society (ATS) therefore recommended using FENO to aid in the diagnosis and monitoring of eosinophilic airway inflammation and asthma, and for identifying steroid responsive individuals whose chronic respiratory symptoms may be caused by airway inflammation \par \par PFT revealed normal flows, with a FEV1 of 2.57L, which is 85% of predicted, with a normal flow volume loop

## 2023-02-07 NOTE — PHYSICAL EXAM
[No Acute Distress] : no acute distress [Well Nourished] : well nourished [Well Groomed] : well groomed [No Deformities] : no deformities [Well Developed] : well developed [II] : Mallampati Class: II [Normal Oropharynx] : normal oropharynx [Normal Appearance] : normal appearance [No Neck Mass] : no neck mass [Normal Rate/Rhythm] : normal rate/rhythm [Normal S1, S2] : normal s1, s2 [No Murmurs] : no murmurs [No Resp Distress] : no resp distress [Clear to Auscultation Bilaterally] : clear to auscultation bilaterally [No Abnormalities] : no abnormalities [Benign] : benign [Normal Gait] : normal gait [No Clubbing] : no clubbing [No Cyanosis] : no cyanosis [No Edema] : no edema [FROM] : FROM [Normal Color/ Pigmentation] : normal color/ pigmentation [No Focal Deficits] : no focal deficits [Oriented x3] : oriented x3 [Normal Affect] : normal affect [TextBox_68] : I:E ratio 1:3; clear

## 2023-02-16 ENCOUNTER — RESULT REVIEW (OUTPATIENT)
Age: 53
End: 2023-02-16

## 2023-02-16 ENCOUNTER — APPOINTMENT (OUTPATIENT)
Dept: ULTRASOUND IMAGING | Facility: CLINIC | Age: 53
End: 2023-02-16
Payer: COMMERCIAL

## 2023-02-16 ENCOUNTER — OUTPATIENT (OUTPATIENT)
Dept: OUTPATIENT SERVICES | Facility: HOSPITAL | Age: 53
LOS: 1 days | End: 2023-02-16
Payer: COMMERCIAL

## 2023-02-16 ENCOUNTER — APPOINTMENT (OUTPATIENT)
Dept: MAMMOGRAPHY | Facility: CLINIC | Age: 53
End: 2023-02-16
Payer: COMMERCIAL

## 2023-02-16 ENCOUNTER — TRANSCRIPTION ENCOUNTER (OUTPATIENT)
Age: 53
End: 2023-02-16

## 2023-02-16 DIAGNOSIS — Z98.890 OTHER SPECIFIED POSTPROCEDURAL STATES: Chronic | ICD-10-CM

## 2023-02-16 DIAGNOSIS — Z12.39 ENCOUNTER FOR OTHER SCREENING FOR MALIGNANT NEOPLASM OF BREAST: ICD-10-CM

## 2023-02-16 DIAGNOSIS — K08.499 PARTIAL LOSS OF TEETH DUE TO OTHER SPECIFIED CAUSE, UNSPECIFIED CLASS: Chronic | ICD-10-CM

## 2023-02-16 PROCEDURE — 76641 ULTRASOUND BREAST COMPLETE: CPT | Mod: 26,50

## 2023-02-16 PROCEDURE — 77067 SCR MAMMO BI INCL CAD: CPT

## 2023-02-16 PROCEDURE — 77063 BREAST TOMOSYNTHESIS BI: CPT

## 2023-02-16 PROCEDURE — 77067 SCR MAMMO BI INCL CAD: CPT | Mod: 26

## 2023-02-16 PROCEDURE — 77063 BREAST TOMOSYNTHESIS BI: CPT | Mod: 26

## 2023-02-16 PROCEDURE — 76641 ULTRASOUND BREAST COMPLETE: CPT

## 2023-02-16 RX ORDER — FLUTICASONE PROPIONATE AND SALMETEROL 113; 14 UG/1; UG/1
113-14 POWDER, METERED RESPIRATORY (INHALATION)
Qty: 1 | Refills: 3 | Status: ACTIVE | COMMUNITY
Start: 2023-02-07 | End: 1900-01-01

## 2023-02-16 RX ORDER — BUDESONIDE 0.5 MG/2ML
0.5 INHALANT ORAL TWICE DAILY
Qty: 60 | Refills: 1 | Status: ACTIVE | COMMUNITY
Start: 2020-04-14 | End: 1900-01-01

## 2023-03-13 ENCOUNTER — TRANSCRIPTION ENCOUNTER (OUTPATIENT)
Age: 53
End: 2023-03-13

## 2023-03-20 ENCOUNTER — TRANSCRIPTION ENCOUNTER (OUTPATIENT)
Age: 53
End: 2023-03-20

## 2023-03-27 ENCOUNTER — TRANSCRIPTION ENCOUNTER (OUTPATIENT)
Age: 53
End: 2023-03-27

## 2023-05-16 NOTE — H&P PST ADULT - TEACHING/LEARNING RELIGIOUS CONSIDERATIONS
[FreeTextEntry1] : imp:costochondritis\par tct mother\par ibuprofen 400 mg po administered for pain. continue tid for 5 days\par avoid exercise/ strenuous activity until resolved\par rtc prn new/worsening or persistent s/s. \par \par intermittent asthma:\par ventolin hfa dispensed\par Reviewed regimen will continue to use albuterol inhaler 2 puffs Q4-6 hours for wheezing/SOB/cough/ and take 2 puffs 15 minutes before exercise\par counseled on proper mdi technique\par advised to call provider or pcp if asthma symptoms become more frequent or worsen\par 
Yes
none

## 2023-08-28 NOTE — ASU PREOP CHECKLIST - ASSESSMENT, HISTORY & PHYSICAL COMPLETED AND ON MEDICAL RECORD
General New:    History of Present Illness:      Ms. Edwards is a 3 year old female who presents today status post tube placement 2/28/2023.  She is doing well.  No otorrhea.  No hearing concerns.  Complaining of any ear issues.  Denies any otitis media episode since she was last seen.       REVIEW OF SYSTEMS:   Per HPI, remaining 10 point review of systems negative    Past Medical History:  Past Medical History:   Diagnosis Date   • History of ear infections    • History of RSV infection         Past Surgical History:   No past surgical history on file.    ALLERGIES:   Allergen Reactions   • Cashew   (Food Or Med) SWELLING   • Cefdinir DIARRHEA     Perfused diarrhea        Medications:   Current Outpatient Medications   Medication Sig Dispense Refill   • cetirizine (ZyrTEC) 5 MG/5ML solution Take by mouth daily.     • hydroCORTisone (CORTIZONE) 2.5 % ointment Apply twice daily to affected areas for two weeks 60 g 2   • LACTOBACILLUS RHAMNOSUS, GG, PO Take 13 mg by mouth daily.       No current facility-administered medications for this visit.        Social History:   Social History     Socioeconomic History   • Marital status: Single     Spouse name: Not on file   • Number of children: Not on file   • Years of education: Not on file   • Highest education level: Not on file   Occupational History   • Not on file   Tobacco Use   • Smoking status: Never   • Smokeless tobacco: Not on file   Substance and Sexual Activity   • Alcohol use: Not on file   • Drug use: Not on file   • Sexual activity: Not on file   Other Topics Concern   • Not on file   Social History Narrative   • Not on file     Social Determinants of Health     Financial Resource Strain: Not on file   Food Insecurity: Not on file   Transportation Needs: Not on file   Physical Activity: Not on file   Stress: Not on file   Social Connections: Not on file   Intimate Partner Violence: Not on file       Family History: Per HPI. No remaining significant family  history related to Head and neck complaints.     Physical Examination:  Visit Vitals  Wt 13.2 kg (29 lb)     General:  alert, oriented, NAD  Skin: warm & dry  Head & Face:  no bony step-offs, no sinus tenderness, salivary glands without masses  Eyes:  PERRL, EOMI, no diplopia or proptosis, no spontaneous nystagmus, no periorbital edema  External ears and nose:  normal  Right ear:  EAC normal, TM intact, tube in place and patent, dry  Left ear:  EAC normal, TM intact, tube in place and patent, dry  Nose: Pink mucosa  Oral cavity:  lips, gums, hard palate and tongue normal  Oropharynx:  palate symmetric without masses or exudates  Neck:  Without  Masses   Lymphatics:  no cervical adenopathy  Neurologic:  cranial nerves 2-12 grossly intact  Psychiatric:  mood normal  Respiratory:  breathing unlabored; no stridor          Audiogram 3/24/2023  Normal soundfield, type B tymp large vol bilaterally      Impression & Plan:   It is my impression that Ms. Edwards is status post BMT 2/28/2023  -Tubes in place and patent.  -Mother reassured.  Discussed dry ear precautions around untreated water.  Follow-up in 3 to 4 months      Efren Donnelly MD             done

## 2023-08-29 LAB
25(OH)D3 SERPL-MCNC: 34 NG/ML
ALBUMIN SERPL ELPH-MCNC: 4.9 G/DL
ALP BLD-CCNC: 83 U/L
ALT SERPL-CCNC: 23 U/L
ANION GAP SERPL CALC-SCNC: 18 MMOL/L
APPEARANCE: CLEAR
AST SERPL-CCNC: 22 U/L
BILIRUB SERPL-MCNC: 0.4 MG/DL
BILIRUBIN URINE: NEGATIVE
BLOOD URINE: NEGATIVE
BUN SERPL-MCNC: 18 MG/DL
CALCIUM SERPL-MCNC: 9.7 MG/DL
CHLORIDE SERPL-SCNC: 102 MMOL/L
CHOLEST SERPL-MCNC: 270 MG/DL
CO2 SERPL-SCNC: 20 MMOL/L
COLOR: YELLOW
CREAT SERPL-MCNC: 0.8 MG/DL
EGFR: 88 ML/MIN/1.73M2
ESTIMATED AVERAGE GLUCOSE: 120 MG/DL
GLUCOSE QUALITATIVE U: NEGATIVE MG/DL
GLUCOSE SERPL-MCNC: 138 MG/DL
HBA1C MFR BLD HPLC: 5.8 %
HDLC SERPL-MCNC: 69 MG/DL
KETONES URINE: NEGATIVE MG/DL
LDLC SERPL CALC-MCNC: 180 MG/DL
LEUKOCYTE ESTERASE URINE: NEGATIVE
NITRITE URINE: NEGATIVE
NONHDLC SERPL-MCNC: 201 MG/DL
PH URINE: 6
POTASSIUM SERPL-SCNC: 5 MMOL/L
PROT SERPL-MCNC: 7.2 G/DL
PROTEIN URINE: NEGATIVE MG/DL
SODIUM SERPL-SCNC: 141 MMOL/L
SPECIFIC GRAVITY URINE: 1.03
T4 FREE SERPL-MCNC: 1.1 NG/DL
TRIGL SERPL-MCNC: 117 MG/DL
TSH SERPL-ACNC: 1.57 UIU/ML
UROBILINOGEN URINE: 0.2 MG/DL
VIT B12 SERPL-MCNC: 498 PG/ML

## 2023-08-30 LAB
M TB IFN-G BLD-IMP: NEGATIVE
QUANTIFERON TB PLUS MITOGEN MINUS NIL: 9.65 IU/ML
QUANTIFERON TB PLUS NIL: 0.02 IU/ML
QUANTIFERON TB PLUS TB1 MINUS NIL: 0 IU/ML
QUANTIFERON TB PLUS TB2 MINUS NIL: 0 IU/ML

## 2023-09-01 ENCOUNTER — APPOINTMENT (OUTPATIENT)
Dept: INTERNAL MEDICINE | Facility: CLINIC | Age: 53
End: 2023-09-01
Payer: COMMERCIAL

## 2023-09-01 VITALS
WEIGHT: 214 LBS | BODY MASS INDEX: 33.59 KG/M2 | SYSTOLIC BLOOD PRESSURE: 120 MMHG | HEART RATE: 101 BPM | TEMPERATURE: 97.8 F | HEIGHT: 67 IN | DIASTOLIC BLOOD PRESSURE: 78 MMHG | OXYGEN SATURATION: 98 %

## 2023-09-01 DIAGNOSIS — E78.00 PURE HYPERCHOLESTEROLEMIA, UNSPECIFIED: ICD-10-CM

## 2023-09-01 DIAGNOSIS — J45.909 UNSPECIFIED ASTHMA, UNCOMPLICATED: ICD-10-CM

## 2023-09-01 PROCEDURE — 99214 OFFICE O/P EST MOD 30 MIN: CPT | Mod: 25

## 2023-09-01 RX ORDER — DEXTROAMPHETAMINE SACCHARATE, AMPHETAMINE ASPARTATE, DEXTROAMPHETAMINE SULFATE AND AMPHETAMINE SULFATE 3.75; 3.75; 3.75; 3.75 MG/1; MG/1; MG/1; MG/1
15 TABLET ORAL
Qty: 60 | Refills: 0 | Status: DISCONTINUED | COMMUNITY
Start: 2023-03-20 | End: 2023-09-01

## 2023-09-01 RX ORDER — DEXTROAMPHETAMINE SACCHARATE, AMPHETAMINE ASPARTATE MONOHYDRATE, DEXTROAMPHETAMINE SULFATE AND AMPHETAMINE SULFATE 3.75; 3.75; 3.75; 3.75 MG/1; MG/1; MG/1; MG/1
15 CAPSULE, EXTENDED RELEASE ORAL
Qty: 30 | Refills: 0 | Status: DISCONTINUED | COMMUNITY
Start: 2022-11-17 | End: 2023-09-01

## 2023-09-01 RX ORDER — ROSUVASTATIN CALCIUM 5 MG/1
5 TABLET, FILM COATED ORAL
Qty: 30 | Refills: 1 | Status: ACTIVE | COMMUNITY
Start: 2023-09-01 | End: 1900-01-01

## 2023-09-01 RX ORDER — SEMAGLUTIDE 1.34 MG/ML
2 INJECTION, SOLUTION SUBCUTANEOUS
Qty: 3 | Refills: 0 | Status: DISCONTINUED | COMMUNITY
Start: 2023-01-18 | End: 2023-09-01

## 2023-09-01 RX ORDER — DEXTROAMPHETAMINE SULFATE, DEXTROAMPHETAMINE SACCHARATE, AMPHETAMINE SULFATE AND AMPHETAMINE ASPARTATE 5; 5; 5; 5 MG/1; MG/1; MG/1; MG/1
20 CAPSULE, EXTENDED RELEASE ORAL
Refills: 0 | Status: ACTIVE | COMMUNITY

## 2023-09-01 RX ORDER — DEXTROAMPHETAMINE SACCHARATE, AMPHETAMINE ASPARTATE, DEXTROAMPHETAMINE SULFATE AND AMPHETAMINE SULFATE 5; 5; 5; 5 MG/1; MG/1; MG/1; MG/1
20 TABLET ORAL
Qty: 60 | Refills: 0 | Status: DISCONTINUED | COMMUNITY
Start: 2023-03-27 | End: 2023-09-01

## 2023-09-01 RX ORDER — ATORVASTATIN CALCIUM 20 MG/1
20 TABLET, FILM COATED ORAL
Qty: 90 | Refills: 1 | Status: DISCONTINUED | COMMUNITY
Start: 2021-10-19 | End: 2023-09-01

## 2023-09-01 NOTE — ASSESSMENT
[FreeTextEntry1] : Labs reviewed with pt today Pre diabetes discussed Advised gyn, colonoscopy, derm cpx when due

## 2023-09-01 NOTE — HISTORY OF PRESENT ILLNESS
[FreeTextEntry1] : fuv [de-identified] : JOE GIL is a 54 yo woman with hypercholesterolemia, ELIDA, s/p left breast lumpectomy here for a fuv, labs and form completion.  She has been well overall.   Stopped lipitor due to muscle aches  Gyn, colonoscopy, derm advised.  Had prior colonoscopy with dr francia reynaga.  Tdap 2/21.  mammogram utd  The patient is  with 2 children.  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

## 2023-09-06 ENCOUNTER — APPOINTMENT (OUTPATIENT)
Dept: PULMONOLOGY | Facility: CLINIC | Age: 53
End: 2023-09-06

## 2023-10-09 NOTE — ASU DISCHARGE PLAN (ADULT/PEDIATRIC) - CARE PROVIDER_API CALL
Addressed in separate encounter. STI screen wnl.    Doris Juan MD   Farooq Bui)  Surgery  [address] [place] [address] [id number]  Phone: [phone]  Fax: [phone]  Follow Up Time:

## 2023-10-17 ENCOUNTER — APPOINTMENT (OUTPATIENT)
Dept: ORTHOPEDIC SURGERY | Facility: CLINIC | Age: 53
End: 2023-10-17
Payer: COMMERCIAL

## 2023-10-17 VITALS — BODY MASS INDEX: 31.52 KG/M2 | HEIGHT: 68 IN | WEIGHT: 208 LBS

## 2023-10-17 DIAGNOSIS — S93.421A SPRAIN OF DELTOID LIGAMENT OF RIGHT ANKLE, INITIAL ENCOUNTER: ICD-10-CM

## 2023-10-17 DIAGNOSIS — M25.852 OTHER SPECIFIED JOINT DISORDERS, LEFT HIP: ICD-10-CM

## 2023-10-17 DIAGNOSIS — M25.571 PAIN IN RIGHT ANKLE AND JOINTS OF RIGHT FOOT: ICD-10-CM

## 2023-10-17 DIAGNOSIS — M25.552 PAIN IN LEFT HIP: ICD-10-CM

## 2023-10-17 DIAGNOSIS — G89.29 PAIN IN LEFT HIP: ICD-10-CM

## 2023-10-17 PROCEDURE — 73502 X-RAY EXAM HIP UNI 2-3 VIEWS: CPT | Mod: LT

## 2023-10-17 PROCEDURE — 99214 OFFICE O/P EST MOD 30 MIN: CPT

## 2023-10-17 PROCEDURE — 73610 X-RAY EXAM OF ANKLE: CPT | Mod: RT

## 2024-02-04 NOTE — BRIEF OPERATIVE NOTE - OPERATION/FINDINGS
Left breast hematoma evacuation, incision through old scar site. Scar excised. Wound washed out with copious saline, extensive clot and debris noted. Fibrillar and Kathleen powder used for hemostasis. Closed with 3-0 prolene interrupted, incision left partially open to drain.
98

## 2024-07-10 NOTE — H&P PST ADULT - SURGICAL SITE INCISION
Completed and faxed on 7/10/24  to [phone], conformation received, original copy left at  for , copy placed in bin for scanning.    no

## 2024-11-16 ENCOUNTER — LABORATORY RESULT (OUTPATIENT)
Age: 54
End: 2024-11-16

## 2024-11-16 LAB
APPEARANCE: CLEAR
BASOPHILS # BLD AUTO: 0.05 K/UL
BASOPHILS NFR BLD AUTO: 0.9 %
BILIRUBIN URINE: NEGATIVE
BLOOD URINE: NEGATIVE
COLOR: YELLOW
EOSINOPHIL # BLD AUTO: 0.06 K/UL
EOSINOPHIL NFR BLD AUTO: 1.1 %
ESTIMATED AVERAGE GLUCOSE: 108 MG/DL
GLUCOSE QUALITATIVE U: NEGATIVE MG/DL
HBA1C MFR BLD HPLC: 5.4 %
HCT VFR BLD CALC: 47.2 %
HGB BLD-MCNC: 15.4 G/DL
IMM GRANULOCYTES NFR BLD AUTO: 0 %
KETONES URINE: NEGATIVE MG/DL
LEUKOCYTE ESTERASE URINE: ABNORMAL
LYMPHOCYTES # BLD AUTO: 2.28 K/UL
LYMPHOCYTES NFR BLD AUTO: 41.2 %
MAN DIFF?: NORMAL
MCHC RBC-ENTMCNC: 31.8 PG
MCHC RBC-ENTMCNC: 32.6 G/DL
MCV RBC AUTO: 97.3 FL
MONOCYTES # BLD AUTO: 0.42 K/UL
MONOCYTES NFR BLD AUTO: 7.6 %
NEUTROPHILS # BLD AUTO: 2.73 K/UL
NEUTROPHILS NFR BLD AUTO: 49.2 %
NITRITE URINE: NEGATIVE
PH URINE: 5.5
PLATELET # BLD AUTO: 418 K/UL
PROTEIN URINE: NORMAL MG/DL
RBC # BLD: 4.85 M/UL
RBC # FLD: 12.9 %
SPECIFIC GRAVITY URINE: 1.02
UROBILINOGEN URINE: 0.2 MG/DL
WBC # FLD AUTO: 5.54 K/UL

## 2024-11-17 LAB
25(OH)D3 SERPL-MCNC: 32.3 NG/ML
ALBUMIN SERPL ELPH-MCNC: 4.4 G/DL
ALP BLD-CCNC: 85 U/L
ALT SERPL-CCNC: 25 U/L
ANION GAP SERPL CALC-SCNC: 17 MMOL/L
AST SERPL-CCNC: 21 U/L
BILIRUB SERPL-MCNC: 0.3 MG/DL
BUN SERPL-MCNC: 16 MG/DL
CALCIUM SERPL-MCNC: 9.9 MG/DL
CHLORIDE SERPL-SCNC: 102 MMOL/L
CHOLEST SERPL-MCNC: 257 MG/DL
CO2 SERPL-SCNC: 23 MMOL/L
CREAT SERPL-MCNC: 0.9 MG/DL
EGFR: 76 ML/MIN/1.73M2
GLUCOSE SERPL-MCNC: 91 MG/DL
HDLC SERPL-MCNC: 70 MG/DL
LDLC SERPL CALC-MCNC: 167 MG/DL
NONHDLC SERPL-MCNC: 187 MG/DL
POTASSIUM SERPL-SCNC: 4.5 MMOL/L
PROT SERPL-MCNC: 7 G/DL
SODIUM SERPL-SCNC: 142 MMOL/L
T4 FREE SERPL-MCNC: 1.2 NG/DL
TRIGL SERPL-MCNC: 113 MG/DL
TSH SERPL-ACNC: 2.06 UIU/ML
VIT B12 SERPL-MCNC: 454 PG/ML

## 2024-11-21 ENCOUNTER — APPOINTMENT (OUTPATIENT)
Dept: INTERNAL MEDICINE | Facility: CLINIC | Age: 54
End: 2024-11-21
Payer: COMMERCIAL

## 2024-11-21 ENCOUNTER — NON-APPOINTMENT (OUTPATIENT)
Age: 54
End: 2024-11-21

## 2024-11-21 ENCOUNTER — LABORATORY RESULT (OUTPATIENT)
Age: 54
End: 2024-11-21

## 2024-11-21 VITALS
HEIGHT: 69 IN | OXYGEN SATURATION: 97 % | HEART RATE: 91 BPM | SYSTOLIC BLOOD PRESSURE: 110 MMHG | TEMPERATURE: 97.8 F | BODY MASS INDEX: 27.73 KG/M2 | DIASTOLIC BLOOD PRESSURE: 74 MMHG | WEIGHT: 187.19 LBS

## 2024-11-21 DIAGNOSIS — Z00.00 ENCOUNTER FOR GENERAL ADULT MEDICAL EXAMINATION W/OUT ABNORMAL FINDINGS: ICD-10-CM

## 2024-11-21 DIAGNOSIS — E78.00 PURE HYPERCHOLESTEROLEMIA, UNSPECIFIED: ICD-10-CM

## 2024-11-21 DIAGNOSIS — J45.909 UNSPECIFIED ASTHMA, UNCOMPLICATED: ICD-10-CM

## 2024-11-21 PROCEDURE — 99396 PREV VISIT EST AGE 40-64: CPT

## 2024-11-21 PROCEDURE — 93000 ELECTROCARDIOGRAM COMPLETE: CPT

## 2024-11-21 RX ORDER — NITROFURANTOIN (MONOHYDRATE/MACROCRYSTALS) 25; 75 MG/1; MG/1
100 CAPSULE ORAL TWICE DAILY
Qty: 10 | Refills: 0 | Status: ACTIVE | COMMUNITY
Start: 2024-11-21 | End: 1900-01-01

## 2024-11-21 RX ORDER — LISDEXAMFETAMINE DIMESYLATE 20 MG/1
20 CAPSULE ORAL
Refills: 0 | Status: ACTIVE | COMMUNITY

## 2024-11-22 LAB
APPEARANCE: CLEAR
BACTERIA UR CULT: NORMAL
BILIRUBIN URINE: NEGATIVE
BLOOD URINE: NEGATIVE
COLOR: YELLOW
GLUCOSE QUALITATIVE U: NEGATIVE MG/DL
KETONES URINE: NEGATIVE MG/DL
LEUKOCYTE ESTERASE URINE: ABNORMAL
NITRITE URINE: NEGATIVE
PH URINE: 5.5
PROTEIN URINE: NEGATIVE MG/DL
SPECIFIC GRAVITY URINE: 1.02
UROBILINOGEN URINE: 0.2 MG/DL

## 2024-11-29 NOTE — ASU PREOP CHECKLIST - NS PREOP CHK HIBICLENS NA
Facial Plastic & Reconstructive Surgery      POV s/p 11/19/24 Left facial nerve exploration, right parotidectomy, CFNG (sural nerve) transfer     Doing well, endorses improved pain and swelling  Continues ointment BID to surgical sites  Left lower extremity pain/numbness much improved since using compressions and elevation.  Needs more gabapentin    Improved lower extremity swelling and improved ecchymosis and swelling of face  No TTP of lower extremity on exam  Nonabsorbable sutures removed from posterior left lower extremity sural nerve graft sites without issue, patient tolerated well  Incisions c/d/I    Plan:  Continue BID washes and ointment to surgical sites   RTC 2 months for next stage surgical planning, or sooner if needed   Gabapentin refill placed today.     Bruce Hill PA-C    ----------------------------------------  11/22/24    Dx: Right facial paralysis  POV s/p 11/19/24 Left facial nerve exploration, right parotidectomy, left CFNG (sural nerve) transfer     Doing well, endorses improved facial pain and swelling  Endorses LEFT foot nerve pain ; taking 100 mg of Gabapentin and oxy which improve the pain  Continues ointment to surgical sites    Incisions c/d/I  Expected postop swelling of left hemiface, a/w ecchymosis  10/10 TTP of bottom of left center plantar surface of foot and posterior ankle  Mild edema of LLE, sutures in place along posterior left lower leg, no signs of dehiscence    Plan:  Continue ointment to surgical sites TID  RTC next Friday for left lower extremity suture removal   Recommend Voltaren gel to areas of sensitivity on left foot/ankle  Compressions all day and elevation as often as possible  Heating pads recommended   Continue Gabapentin, oxy prn, and Ibuprofen PRN    Bruce Hill PA-C     #1:

## 2025-02-21 ENCOUNTER — APPOINTMENT (OUTPATIENT)
Dept: MAMMOGRAPHY | Facility: CLINIC | Age: 55
End: 2025-02-21
Payer: COMMERCIAL

## 2025-02-21 ENCOUNTER — RESULT REVIEW (OUTPATIENT)
Age: 55
End: 2025-02-21

## 2025-02-21 ENCOUNTER — APPOINTMENT (OUTPATIENT)
Dept: ULTRASOUND IMAGING | Facility: CLINIC | Age: 55
End: 2025-02-21
Payer: COMMERCIAL

## 2025-02-21 ENCOUNTER — OUTPATIENT (OUTPATIENT)
Dept: OUTPATIENT SERVICES | Facility: HOSPITAL | Age: 55
LOS: 1 days | End: 2025-02-21
Payer: COMMERCIAL

## 2025-02-21 DIAGNOSIS — K08.499 PARTIAL LOSS OF TEETH DUE TO OTHER SPECIFIED CAUSE, UNSPECIFIED CLASS: Chronic | ICD-10-CM

## 2025-02-21 DIAGNOSIS — Z00.8 ENCOUNTER FOR OTHER GENERAL EXAMINATION: ICD-10-CM

## 2025-02-21 DIAGNOSIS — Z98.890 OTHER SPECIFIED POSTPROCEDURAL STATES: Chronic | ICD-10-CM

## 2025-02-21 PROCEDURE — 77067 SCR MAMMO BI INCL CAD: CPT | Mod: 26

## 2025-02-21 PROCEDURE — 77063 BREAST TOMOSYNTHESIS BI: CPT

## 2025-02-21 PROCEDURE — 76641 ULTRASOUND BREAST COMPLETE: CPT

## 2025-02-21 PROCEDURE — 77063 BREAST TOMOSYNTHESIS BI: CPT | Mod: 26

## 2025-02-21 PROCEDURE — 76641 ULTRASOUND BREAST COMPLETE: CPT | Mod: 26,50

## 2025-02-21 PROCEDURE — 77067 SCR MAMMO BI INCL CAD: CPT

## 2025-06-05 NOTE — ASU PATIENT PROFILE, ADULT - NSALCOHOLFREQ_GEN_A_CORE_SD
Patient Name: Desiree Garcia Account #: [id number]    MRN: [id number] Admission Date: 6/5/2025      Consulting Service: Vascular Surgery Date of Evaluation: June 5, 2025    Requesting Provider: Shyam Gutierrez MD    CHIEF COMPLAINT: Unable to get wound care or home meds after discharge from rehab  HPI: Desiree Garcia is a 64 y.o. female is being seen for a consultation and evaluation/management of difficulty in getting wound care and home meds after discharge from rehab.  This appears to be more of a social situation than the physical 1.  At this point in time being off of her Eliquis is extremely dangerous and we will order this immediately.  Looking at the pictures of her wounds this does not appear to be an infectious situation.  I believe she is actually healing very well and her physical therapy has been successful in improving her leg function significantly.    PAST MEDICAL HISTORY:   Past Medical History:   Diagnosis Date    Arthritis     Cervical cancer screening     COPD (chronic obstructive pulmonary disease)     Coronary artery disease involving native heart without angina pectoris 09/30/2021    primary angioplasty and stent placement to mid right coronary artery with good angiographic results on 6/14/2021 after STEMI    Depression with anxiety     Diabetes mellitus     Forgetfulness     Gastric reflux     Hypertension     Leg paresthesia 01/20/2017    Right    Limb swelling     Lumbago 01/20/2017    Low back pain    Lumbar spinal stenosis 02/23/2017    L4/5 moderate to severe central canal stenosis    Lumbosacral radiculopathy 01/20/2017    Right    Migraines     Night sweat     Reflux esophagitis     Shortness of breath     ST elevation myocardial infarction (STEMI) (CMS/[facility]) 06/14/2021    Stomach disorder       PAST SURGICAL HISTORY:   Past Surgical History:   Procedure Laterality Date    ABDOMINAL SURGERY      3 C-SECTIONS    ARTERIOGRAM LOWER EXTREMITY Left 4/12/2025    Procedure: ARTERIOGRAM  LOWER EXTREMITY;  Surgeon: Misael Lawton MD;  Location: [facility] OR;  Service: Vascular;  Laterality: Left;    CARDIAC CATHETERIZATION      CARDIAC CATHETERIZATION N/A 2021    Procedure: Left Heart Cath;  Surgeon: Sidney Xiao MD;  Location: [facility] CATH INVASIVE LOCATION;  Service: Cardiology;  Laterality: N/A;    CARDIAC CATHETERIZATION N/A 2025    Procedure: Left Heart Cath;  Surgeon: James Verdugo MD;  Location: [facility] CATH INVASIVE LOCATION;  Service: Cardiology;  Laterality: N/A;     SECTION      x 3    CHOLECYSTECTOMY      COLONOSCOPY      COLONOSCOPY N/A 3/25/2025    Procedure: COLONOSCOPY WITH BIOPSIES AND COLD AND HOT SNARE POLYPECTOMIES;  Surgeon: Heber Najera MD;  Location: [facility] ENDOSCOPY;  Service: Gastroenterology;  Laterality: N/A;  COLON POLYPS    CORONARY STENT PLACEMENT      EMBOLECTOMY Left 2025    Procedure: EMBOLECTOMY MECHANICAL, FOUR COMPARTMENT FASCIOTOMY;  Surgeon: Misael Lawton MD;  Location: [facility] OR;  Service: Vascular;  Laterality: Left;    ENDOSCOPY      2007    FOOT SURGERY Right     HAND SURGERY      HYSTERECTOMY  1985    INCISION AND DRAINAGE LEG Left 2025    Procedure: LOWER EXTREMITY DEBRIDEMENT;  Surgeon: Misael Lawton MD;  Location: [facility] OR;  Service: Vascular;  Laterality: Left;    INCISION AND DRAINAGE LEG Left 2025    Procedure: Left lower extremity fasciotomy debridement with possible wound VAC placement;  Surgeon: Raimundo Lin MD;  Location: [facility] OR;  Service: Vascular;  Laterality: Left;      FAMILY HISTORY:   Family History   Problem Relation Age of Onset    Stroke Mother     Lung cancer Mother         Unspecified    Arthritis Mother     Stroke Father     Heart disease Father     Diabetes Father         Unspecified type    Arthritis Father     Heart attack Father     Stroke Sister     Arthritis Sister     Breast cancer Sister     Stroke Brother     Heart disease Brother      Diabetes Brother         Unspecified type    Arthritis Brother     Colon cancer Neg Hx       SOCIAL HISTORY:   Social History     Tobacco Use    Smoking status: Every Day     Current packs/day: 1.00     Average packs/day: 1 pack/day for 58.4 years (58.4 ttl pk-yrs)     Types: Cigarettes     Start date: 1/14/1967     Passive exposure: Current    Smokeless tobacco: Never    Tobacco comments:     Smoked 21-30 years   Vaping Use    Vaping status: Never Used   Substance Use Topics    Alcohol use: Never     Comment: Does not drink    Drug use: Not Currently     Types: Methamphetamines      MEDICATIONS:   No current facility-administered medications on file prior to encounter.     Current Outpatient Medications on File Prior to Encounter   Medication Sig Dispense Refill    acetaminophen (TYLENOL) 325 MG tablet Take 2 tablets by mouth Every 4 (Four) Hours As Needed for Mild Pain or Fever (temperature greater than 101F).      albuterol (PROVENTIL) (2.5 MG/3ML) 0.083% nebulizer solution Take 2.5 mg by nebulization Every 6 (Six) Hours As Needed for Wheezing or Shortness of Air.      apixaban (ELIQUIS) 5 MG tablet tablet Take 1 tablet by mouth Every 12 (Twelve) Hours. Indications: Other - full anticoagulation      atorvastatin (LIPITOR) 40 MG tablet Take 1 tablet by mouth Every Night for 90 days. 90 tablet 3    busPIRone (BUSPAR) 5 MG tablet Take 1 tablet by mouth 3 (Three) Times a Day As Needed (anxiety). 60 tablet 0    clopidogrel (PLAVIX) 75 MG tablet Take 1 tablet by mouth Daily.      dextrose (GLUTOSE) 40 % gel Take 15 g by mouth Every 15 (Fifteen) Minutes As Needed for Low Blood Sugar (Blood sugar less than 70). (Patient not taking: Reported on 6/5/2025)      dicyclomine (BENTYL) 10 MG capsule Take 1 capsule by mouth 4 (Four) Times a Day As Needed for Abdominal Cramping. (Patient not taking: Reported on 6/5/2025)      Fluticasone-Umeclidin-Vilant (Trelegy Ellipta) 100-62.5-25 MCG/ACT inhaler Inhale 1 puff Daily.       glucagon (GLUCAGEN) 1 MG injection Inject 1 mg into the appropriate muscle as directed by prescriber Every 15 (Fifteen) Minutes As Needed (Blood Glucose Less Than 70). (Patient not taking: Reported on 6/5/2025)      insulin lispro (HUMALOG/ADMELOG) 100 UNIT/ML injection Inject 2-7 Units under the skin into the appropriate area as directed 4 (Four) Times a Day Before Meals & at Bedtime. (Patient not taking: Reported on 6/5/2025)      lisinopril (PRINIVIL,ZESTRIL) 10 MG tablet Take 1 tablet by mouth Daily. 30 tablet 1    metFORMIN (GLUCOPHAGE) 500 MG tablet Take 1 tablet by mouth 2 (Two) Times a Day With Meals.      metoprolol succinate XL (Toprol XL) 50 MG 24 hr tablet Take 1 tablet by mouth Daily.      nitroglycerin (NITROSTAT) 0.4 MG SL tablet Place 1 tablet under the tongue Every 5 (Five) Minutes As Needed for Chest Pain (Systolic BP Greater Than 100). Take no more than 3 doses in 15 minutes. (Patient not taking: Reported on 6/5/2025)      simethicone (MYLICON) 80 MG chewable tablet Chew 1 tablet 4 (Four) Times a Day As Needed for Flatulence.      [DISCONTINUED] apixaban (ELIQUIS) 5 MG tablet tablet Take 1 tablet by mouth Every 12 (Twelve) Hours. Indications: Other - full anticoagulation      [DISCONTINUED] clopidogrel (PLAVIX) 75 MG tablet Take 1 tablet by mouth Daily.      [DISCONTINUED] lisinopril (PRINIVIL,ZESTRIL) 10 MG tablet  (Patient not taking: Reported on 6/5/2025)      [DISCONTINUED] metFORMIN (GLUCOPHAGE) 500 MG tablet Take 1 tablet by mouth 2 (Two) Times a Day With Meals.      [DISCONTINUED] metoprolol succinate XL (Toprol XL) 50 MG 24 hr tablet Take 1 tablet by mouth Daily.               ALLERGIES: Tramadol   COMPLETE REVIEW OF SYSTEMS:     ENT ROS: negative  Cardiovascular ROS: no chest pain or dyspnea on exertion  Respiratory ROS: no cough, shortness of breath, or wheezing  Gastrointestinal ROS: no abdominal pain, change in bowel habits, or black or bloody stools  Neurological ROS: no TIA or stroke  "symptoms  Genito-Urinary ROS: no dysuria, trouble voiding, or hematuria  Musculoskeletal ROS: positive for -pain and swelling in the left lower extremity  Dermatological ROS: negative  Psychological ROS: negative      PHYSICAL EXAM:   Patient Vitals for the past 24 hrs:   BP Temp Temp src Pulse Resp SpO2 Height Weight   06/05/25 1934 109/93 98.5 °F (36.9 °C) Oral 75 18 100 % 167.6 cm (66\") 73.9 kg (163 lb)   06/05/25 1511 -- 98.5 °F (36.9 °C) -- 99 18 95 % -- --        General appearance: oriented to person, place, and time, anxious, in mild to moderate distress, ill-appearing, chronically ill appearing, and uncooperative.  Neurological exam reveals alert, oriented, normal speech, no focal findings or movement disorder noted.  ENT exam reveals - ENT exam normal, no neck nodes or sinus tenderness.  CVS exam: normal rate, regular rhythm, normal S1, S2, no murmurs, rubs, clicks or gallops.  Chest: clear to auscultation, no wheezes, rales or rhonchi, symmetric air entry.  Abdominal exam: soft, nontender, nondistended, no masses or organomegaly.  Examination of the feet reveals warm, good capillary refill mild swelling to the left foot.  Dressings in place.  Signals present all sites.        LABS:      Results Review:       I reviewed the patient's new clinical results.  Results from last 7 days   Lab Units 06/05/25  1525 06/05/25  0934   WBC 10*3/mm3 10.79 9.05   HEMOGLOBIN g/dL 9.6* 9.7*   PLATELETS 10*3/mm3 424 413     Results from last 7 days   Lab Units 06/05/25  1525 06/05/25  0934   SODIUM mmol/L 142 139   POTASSIUM mmol/L 3.4* 3.8   CHLORIDE mmol/L 103 102   CO2 mmol/L 28.9 26.0   BUN mg/dL 7.0* 7.0*   CREATININE mg/dL 0.72 0.64   GLUCOSE mg/dL 99 134*   Estimated Creatinine Clearance: 81.1 mL/min (by C-G formula based on SCr of 0.72 mg/dL).  Results from last 7 days   Lab Units 06/05/25  1525 06/05/25  0934   CALCIUM mg/dL 9.2 9.1   ALBUMIN g/dL 3.5 3.4*           The following radiologic or non-invasive studies " have been reviewed by me: Prior studies reviewed    Active Hospital Problems    Diagnosis  POA    **Leg wound, left [W66.646D]  Yes      Resolved Hospital Problems   No resolved problems to display.         ASSESSMENT/PLAN: 64 y.o. female with salvaged leg with swelling and wounds are still in process of healing but viable tissue and warm and functional.  Is actually getting significantly better.  The majority of her problems currently seem to be an inability to get home care straightened out and get things taken care of at her home.  It is unclear how much of this is related to the discharge from the nursing home versus her baseline social situation.  Will await social work help.  Will evaluate wounds tomorrow but based on pictures from the ER things look clean and healthy.  Will check ABIs as she was scheduled to have those done today.  She was actually supposed to be in the office today for a wound check and WEST but missed that appointment.  We will get these tests and make sure when she discharges we have further follow-up set.  Hopefully she will be compliant.  She states she was on and off her anticoagulation due to inability to get her prescription.  This is obviously something that cannot happen and if she goes long enough without anticoagulation she would likely have a catastrophic event.  She has been made aware of this again.  Check her tomorrow.      I discussed the plan with the patient and she is agreeable to the plan of care at this point. Thank you for this consult.     Misael Lawton MD   06/05/25      2-3 times/wk

## 2025-07-01 ENCOUNTER — APPOINTMENT (OUTPATIENT)
Dept: INTERNAL MEDICINE | Facility: CLINIC | Age: 55
End: 2025-07-01

## (undated) DEVICE — NDL HYPO SAFE 25G X 1" (ORANGE)

## (undated) DEVICE — DRSG STERISTRIPS 0.5 X 4"

## (undated) DEVICE — DRSG MAMMARY SUPPORT MED SIZE 3

## (undated) DEVICE — SUT VICRYL 3-0 27" SH UNDYED

## (undated) DEVICE — SOL IRR POUR NS 0.9% 500ML

## (undated) DEVICE — DRAPE CHEST BREAST 106" X 122"

## (undated) DEVICE — VENODYNE/SCD SLEEVE CALF MEDIUM

## (undated) DEVICE — ONETRAC LIGHTED RETRACTOR 90 X 22MM DISP

## (undated) DEVICE — RADIOGRAPHY DVC SPEC TRANSPEC

## (undated) DEVICE — ELCTR BOVIE TIP BLADE INSULATED 2.75" EDGE

## (undated) DEVICE — GLV 7.5 PROTEXIS (WHITE)

## (undated) DEVICE — DRAPE TOWEL BLUE 17" X 24"

## (undated) DEVICE — SUT MONOCRYL 4-0 27" PS-2 UNDYED

## (undated) DEVICE — DRSG MAMMARY SUPPORT LG SIZE 4

## (undated) DEVICE — DRSG TEGADERM 4X4.75"

## (undated) DEVICE — ELCTR GROUNDING PAD ADULT COVIDIEN

## (undated) DEVICE — DRSG MAMMARY SUPPORT MED SIZE 2

## (undated) DEVICE — LAP PAD W RING 18 X 18"

## (undated) DEVICE — DRSG MAMMARY SUPPORT SM SIZE 1

## (undated) DEVICE — DRSG MAMMARY SUPPORT XL SIZE 5

## (undated) DEVICE — POSITIONER STRAP ARMBOARD VELCRO TS-30

## (undated) DEVICE — SUT SILK 2-0 30" SH

## (undated) DEVICE — WARMING BLANKET LOWER ADULT

## (undated) DEVICE — GOWN LG